# Patient Record
Sex: FEMALE | Race: WHITE | NOT HISPANIC OR LATINO | Employment: UNEMPLOYED | URBAN - METROPOLITAN AREA
[De-identification: names, ages, dates, MRNs, and addresses within clinical notes are randomized per-mention and may not be internally consistent; named-entity substitution may affect disease eponyms.]

---

## 2022-11-23 ENCOUNTER — HOSPITAL ENCOUNTER (INPATIENT)
Facility: HOSPITAL | Age: 52
LOS: 1 days | Discharge: ANOTHER HOSPITAL | DRG: 392 | End: 2022-11-24
Attending: STUDENT IN AN ORGANIZED HEALTH CARE EDUCATION/TRAINING PROGRAM | Admitting: STUDENT IN AN ORGANIZED HEALTH CARE EDUCATION/TRAINING PROGRAM
Payer: COMMERCIAL

## 2022-11-23 DIAGNOSIS — R73.9 HYPERGLYCEMIA: ICD-10-CM

## 2022-11-23 DIAGNOSIS — K52.9 COLITIS: Primary | ICD-10-CM

## 2022-11-23 DIAGNOSIS — E87.29 HIGH ANION GAP METABOLIC ACIDOSIS: ICD-10-CM

## 2022-11-23 DIAGNOSIS — N17.9 AKI (ACUTE KIDNEY INJURY): ICD-10-CM

## 2022-11-23 PROBLEM — I10 PRIMARY HYPERTENSION: Status: ACTIVE | Noted: 2022-11-23

## 2022-11-23 PROBLEM — E11.9 TYPE 2 DIABETES MELLITUS, WITHOUT LONG-TERM CURRENT USE OF INSULIN: Status: ACTIVE | Noted: 2022-11-23

## 2022-11-23 LAB
ACETONE BLD-MCNC: NEGATIVE MG/DL
ALBUMIN SERPL BCP-MCNC: 4.2 G/DL (ref 3.5–5.2)
ALLENS TEST: ABNORMAL
ALP SERPL-CCNC: 75 U/L (ref 55–135)
ALT SERPL W/O P-5'-P-CCNC: 9 U/L (ref 10–44)
AMORPH CRY URNS QL MICRO: ABNORMAL
ANION GAP SERPL CALC-SCNC: 15 MMOL/L (ref 8–16)
AST SERPL-CCNC: 20 U/L (ref 10–40)
BACTERIA #/AREA URNS HPF: ABNORMAL /HPF
BASOPHILS # BLD AUTO: 0.04 K/UL (ref 0–0.2)
BASOPHILS NFR BLD: 0.2 % (ref 0–1.9)
BILIRUB SERPL-MCNC: 0.5 MG/DL (ref 0.1–1)
BILIRUB UR QL STRIP: NEGATIVE
BUN SERPL-MCNC: 62 MG/DL (ref 6–20)
CALCIUM SERPL-MCNC: 9.8 MG/DL (ref 8.7–10.5)
CHLORIDE SERPL-SCNC: 107 MMOL/L (ref 95–110)
CK SERPL-CCNC: 59 U/L (ref 20–180)
CLARITY UR: CLEAR
CO2 SERPL-SCNC: 18 MMOL/L (ref 23–29)
COLOR UR: YELLOW
CREAT SERPL-MCNC: 4.3 MG/DL (ref 0.5–1.4)
CREAT UR-MCNC: 84.2 MG/DL (ref 15–325)
CRP SERPL-MCNC: 14.8 MG/L (ref 0–8.2)
DELSYS: ABNORMAL
DIFFERENTIAL METHOD: ABNORMAL
EOSINOPHIL # BLD AUTO: 0 K/UL (ref 0–0.5)
EOSINOPHIL NFR BLD: 0.1 % (ref 0–8)
ERYTHROCYTE [DISTWIDTH] IN BLOOD BY AUTOMATED COUNT: 13.4 % (ref 11.5–14.5)
ERYTHROCYTE [SEDIMENTATION RATE] IN BLOOD BY WESTERGREN METHOD: 17 MM/HR (ref 0–20)
EST. GFR  (NO RACE VARIABLE): 11.7 ML/MIN/1.73 M^2
GLUCOSE SERPL-MCNC: 135 MG/DL (ref 70–110)
GLUCOSE UR QL STRIP: NEGATIVE
HCO3 UR-SCNC: 20.4 MMOL/L (ref 24–28)
HCT VFR BLD AUTO: 39.4 % (ref 37–48.5)
HGB BLD-MCNC: 12.9 G/DL (ref 12–16)
HGB UR QL STRIP: ABNORMAL
HYALINE CASTS #/AREA URNS LPF: 0 /LPF
IMM GRANULOCYTES # BLD AUTO: 0.07 K/UL (ref 0–0.04)
IMM GRANULOCYTES NFR BLD AUTO: 0.4 % (ref 0–0.5)
KETONES UR QL STRIP: NEGATIVE
LACTATE SERPL-SCNC: 1.8 MMOL/L (ref 0.5–2.2)
LEUKOCYTE ESTERASE UR QL STRIP: NEGATIVE
LIPASE SERPL-CCNC: 90 U/L (ref 4–60)
LYMPHOCYTES # BLD AUTO: 1 K/UL (ref 1–4.8)
LYMPHOCYTES NFR BLD: 5.6 % (ref 18–48)
MCH RBC QN AUTO: 27.3 PG (ref 27–31)
MCHC RBC AUTO-ENTMCNC: 32.7 G/DL (ref 32–36)
MCV RBC AUTO: 84 FL (ref 82–98)
MICROSCOPIC COMMENT: ABNORMAL
MODE: ABNORMAL
MONOCYTES # BLD AUTO: 0.9 K/UL (ref 0.3–1)
MONOCYTES NFR BLD: 5.5 % (ref 4–15)
NEUTROPHILS # BLD AUTO: 15 K/UL (ref 1.8–7.7)
NEUTROPHILS NFR BLD: 88.2 % (ref 38–73)
NITRITE UR QL STRIP: NEGATIVE
NRBC BLD-RTO: 0 /100 WBC
PCO2 BLDA: 36.8 MMHG (ref 35–45)
PH SMN: 7.35 [PH] (ref 7.35–7.45)
PH UR STRIP: 5 [PH] (ref 5–8)
PLATELET # BLD AUTO: 255 K/UL (ref 150–450)
PMV BLD AUTO: 11.2 FL (ref 9.2–12.9)
PO2 BLDA: 13 MMHG (ref 40–60)
POC BE: -5 MMOL/L
POC SATURATED O2: 14 % (ref 95–100)
POC TCO2: 21 MMOL/L (ref 24–29)
POCT GLUCOSE: 113 MG/DL (ref 70–110)
POTASSIUM SERPL-SCNC: 4.8 MMOL/L (ref 3.5–5.1)
PROT SERPL-MCNC: 7.3 G/DL (ref 6–8.4)
PROT UR QL STRIP: ABNORMAL
RBC # BLD AUTO: 4.72 M/UL (ref 4–5.4)
RBC #/AREA URNS HPF: 6 /HPF (ref 0–4)
SAMPLE: ABNORMAL
SITE: ABNORMAL
SODIUM SERPL-SCNC: 140 MMOL/L (ref 136–145)
SODIUM UR-SCNC: 65 MMOL/L (ref 20–250)
SP GR UR STRIP: 1.01 (ref 1–1.03)
SP02: 100
URN SPEC COLLECT METH UR: ABNORMAL
UROBILINOGEN UR STRIP-ACNC: NEGATIVE EU/DL
WBC # BLD AUTO: 17 K/UL (ref 3.9–12.7)
WBC #/AREA URNS HPF: 3 /HPF (ref 0–5)

## 2022-11-23 PROCEDURE — 83690 ASSAY OF LIPASE: CPT | Performed by: STUDENT IN AN ORGANIZED HEALTH CARE EDUCATION/TRAINING PROGRAM

## 2022-11-23 PROCEDURE — 27000207 HC ISOLATION

## 2022-11-23 PROCEDURE — 96365 THER/PROPH/DIAG IV INF INIT: CPT

## 2022-11-23 PROCEDURE — 96375 TX/PRO/DX INJ NEW DRUG ADDON: CPT

## 2022-11-23 PROCEDURE — 93010 ELECTROCARDIOGRAM REPORT: CPT | Mod: ,,, | Performed by: INTERNAL MEDICINE

## 2022-11-23 PROCEDURE — 87040 BLOOD CULTURE FOR BACTERIA: CPT | Performed by: STUDENT IN AN ORGANIZED HEALTH CARE EDUCATION/TRAINING PROGRAM

## 2022-11-23 PROCEDURE — 82009 KETONE BODYS QUAL: CPT | Performed by: STUDENT IN AN ORGANIZED HEALTH CARE EDUCATION/TRAINING PROGRAM

## 2022-11-23 PROCEDURE — 74177 CT ABDOMEN PELVIS WITH CONTRAST: ICD-10-PCS | Mod: 26,,, | Performed by: RADIOLOGY

## 2022-11-23 PROCEDURE — 82570 ASSAY OF URINE CREATININE: CPT | Performed by: STUDENT IN AN ORGANIZED HEALTH CARE EDUCATION/TRAINING PROGRAM

## 2022-11-23 PROCEDURE — 84300 ASSAY OF URINE SODIUM: CPT | Performed by: STUDENT IN AN ORGANIZED HEALTH CARE EDUCATION/TRAINING PROGRAM

## 2022-11-23 PROCEDURE — 93005 ELECTROCARDIOGRAM TRACING: CPT

## 2022-11-23 PROCEDURE — 99900035 HC TECH TIME PER 15 MIN (STAT)

## 2022-11-23 PROCEDURE — 63600175 PHARM REV CODE 636 W HCPCS: Performed by: STUDENT IN AN ORGANIZED HEALTH CARE EDUCATION/TRAINING PROGRAM

## 2022-11-23 PROCEDURE — 25500020 PHARM REV CODE 255: Performed by: STUDENT IN AN ORGANIZED HEALTH CARE EDUCATION/TRAINING PROGRAM

## 2022-11-23 PROCEDURE — 71045 X-RAY EXAM CHEST 1 VIEW: CPT | Mod: 26,,, | Performed by: RADIOLOGY

## 2022-11-23 PROCEDURE — 87389 HIV-1 AG W/HIV-1&-2 AB AG IA: CPT | Performed by: EMERGENCY MEDICINE

## 2022-11-23 PROCEDURE — 71045 XR CHEST AP PORTABLE: ICD-10-PCS | Mod: 26,,, | Performed by: RADIOLOGY

## 2022-11-23 PROCEDURE — 99285 EMERGENCY DEPT VISIT HI MDM: CPT | Mod: 25

## 2022-11-23 PROCEDURE — 85651 RBC SED RATE NONAUTOMATED: CPT | Performed by: STUDENT IN AN ORGANIZED HEALTH CARE EDUCATION/TRAINING PROGRAM

## 2022-11-23 PROCEDURE — 81000 URINALYSIS NONAUTO W/SCOPE: CPT | Performed by: STUDENT IN AN ORGANIZED HEALTH CARE EDUCATION/TRAINING PROGRAM

## 2022-11-23 PROCEDURE — 86803 HEPATITIS C AB TEST: CPT | Performed by: EMERGENCY MEDICINE

## 2022-11-23 PROCEDURE — 25000003 PHARM REV CODE 250: Performed by: STUDENT IN AN ORGANIZED HEALTH CARE EDUCATION/TRAINING PROGRAM

## 2022-11-23 PROCEDURE — 83605 ASSAY OF LACTIC ACID: CPT | Performed by: STUDENT IN AN ORGANIZED HEALTH CARE EDUCATION/TRAINING PROGRAM

## 2022-11-23 PROCEDURE — 82550 ASSAY OF CK (CPK): CPT | Performed by: STUDENT IN AN ORGANIZED HEALTH CARE EDUCATION/TRAINING PROGRAM

## 2022-11-23 PROCEDURE — 36415 COLL VENOUS BLD VENIPUNCTURE: CPT | Performed by: STUDENT IN AN ORGANIZED HEALTH CARE EDUCATION/TRAINING PROGRAM

## 2022-11-23 PROCEDURE — 80053 COMPREHEN METABOLIC PANEL: CPT | Performed by: STUDENT IN AN ORGANIZED HEALTH CARE EDUCATION/TRAINING PROGRAM

## 2022-11-23 PROCEDURE — 86140 C-REACTIVE PROTEIN: CPT | Performed by: STUDENT IN AN ORGANIZED HEALTH CARE EDUCATION/TRAINING PROGRAM

## 2022-11-23 PROCEDURE — 93010 EKG 12-LEAD: ICD-10-PCS | Mod: ,,, | Performed by: INTERNAL MEDICINE

## 2022-11-23 PROCEDURE — 99223 PR INITIAL HOSPITAL CARE,LEVL III: ICD-10-PCS | Mod: ,,, | Performed by: STUDENT IN AN ORGANIZED HEALTH CARE EDUCATION/TRAINING PROGRAM

## 2022-11-23 PROCEDURE — 21400001 HC TELEMETRY ROOM

## 2022-11-23 PROCEDURE — 99223 1ST HOSP IP/OBS HIGH 75: CPT | Mod: ,,, | Performed by: STUDENT IN AN ORGANIZED HEALTH CARE EDUCATION/TRAINING PROGRAM

## 2022-11-23 PROCEDURE — 85025 COMPLETE CBC W/AUTO DIFF WBC: CPT | Performed by: STUDENT IN AN ORGANIZED HEALTH CARE EDUCATION/TRAINING PROGRAM

## 2022-11-23 PROCEDURE — 71045 X-RAY EXAM CHEST 1 VIEW: CPT | Mod: TC

## 2022-11-23 PROCEDURE — 96361 HYDRATE IV INFUSION ADD-ON: CPT

## 2022-11-23 PROCEDURE — 82803 BLOOD GASES ANY COMBINATION: CPT

## 2022-11-23 PROCEDURE — 74177 CT ABD & PELVIS W/CONTRAST: CPT | Mod: 26,,, | Performed by: RADIOLOGY

## 2022-11-23 PROCEDURE — 74177 CT ABD & PELVIS W/CONTRAST: CPT | Mod: TC

## 2022-11-23 PROCEDURE — 82962 GLUCOSE BLOOD TEST: CPT

## 2022-11-23 RX ORDER — ONDANSETRON 2 MG/ML
4 INJECTION INTRAMUSCULAR; INTRAVENOUS
Status: COMPLETED | OUTPATIENT
Start: 2022-11-23 | End: 2022-11-23

## 2022-11-23 RX ORDER — HEPARIN SODIUM 5000 [USP'U]/ML
5000 INJECTION, SOLUTION INTRAVENOUS; SUBCUTANEOUS EVERY 8 HOURS
Status: DISCONTINUED | OUTPATIENT
Start: 2022-11-24 | End: 2022-11-24 | Stop reason: HOSPADM

## 2022-11-23 RX ORDER — SODIUM CHLORIDE, SODIUM LACTATE, POTASSIUM CHLORIDE, CALCIUM CHLORIDE 600; 310; 30; 20 MG/100ML; MG/100ML; MG/100ML; MG/100ML
INJECTION, SOLUTION INTRAVENOUS CONTINUOUS
Status: DISCONTINUED | OUTPATIENT
Start: 2022-11-23 | End: 2022-11-24 | Stop reason: HOSPADM

## 2022-11-23 RX ADMIN — PIPERACILLIN AND TAZOBACTAM 4.5 G: 4; .5 INJECTION, POWDER, LYOPHILIZED, FOR SOLUTION INTRAVENOUS; PARENTERAL at 04:11

## 2022-11-23 RX ADMIN — IOHEXOL 100 ML: 350 INJECTION, SOLUTION INTRAVENOUS at 02:11

## 2022-11-23 RX ADMIN — SODIUM CHLORIDE 1000 ML: 0.9 INJECTION, SOLUTION INTRAVENOUS at 02:11

## 2022-11-23 RX ADMIN — SODIUM CHLORIDE 1000 ML: 0.9 INJECTION, SOLUTION INTRAVENOUS at 03:11

## 2022-11-23 RX ADMIN — ONDANSETRON HYDROCHLORIDE 4 MG: 2 SOLUTION INTRAMUSCULAR; INTRAVENOUS at 01:11

## 2022-11-23 RX ADMIN — SODIUM CHLORIDE, POTASSIUM CHLORIDE, SODIUM LACTATE AND CALCIUM CHLORIDE 1000 ML: 600; 310; 30; 20 INJECTION, SOLUTION INTRAVENOUS at 05:11

## 2022-11-23 RX ADMIN — SODIUM CHLORIDE, SODIUM LACTATE, POTASSIUM CHLORIDE, AND CALCIUM CHLORIDE: .6; .31; .03; .02 INJECTION, SOLUTION INTRAVENOUS at 07:11

## 2022-11-23 NOTE — ED PROVIDER NOTES
Encounter Date: 11/23/2022       History     Chief Complaint   Patient presents with    Nausea    Abdominal Pain    Diarrhea    Vomiting     HPI    Ms. Braga is a 52 year old female with past medical history of type 2 diabetes and hypertension, presenting with several days with diffuse abdominal pain described as getting stabbed in the belly repeatedly, nausea and vomiting, watery diarrhea.  Pain, nausea and diarrhea acutely worsened this morning so she presented to the ED for evaluation.  Has been on a new diabetes medication (Mounjaro, tirzepatide) that is once a week injection, usually gets injection on Friday and has 1 day of symptoms, but this was worse and lasted much longer.  Unable to tolerate any p.o., denies any urinary symptoms but says her urine has been frothy.  Denies any blood in her vomit or diarrhea.  She denies a history of diabetic ketoacidosis.  She used to take metformin but stopped due to side effects.  She denies any sick contacts with similar symptoms.      Review of patient's allergies indicates:   Allergen Reactions    Sulfa (sulfonamide antibiotics) Rash     No past medical history on file.  No past surgical history on file.  No family history on file.     Review of Systems   Constitutional:  Positive for activity change and appetite change. Negative for chills and fever.   HENT:  Negative for congestion and sore throat.    Eyes:  Negative for photophobia and visual disturbance.   Respiratory:  Negative for shortness of breath.    Cardiovascular:  Negative for chest pain and palpitations.   Gastrointestinal:  Positive for abdominal pain, diarrhea, nausea and vomiting. Negative for blood in stool.   Genitourinary:  Negative for dysuria and flank pain.   Musculoskeletal:  Negative for back pain.   Skin:  Negative for rash.   Neurological:  Positive for weakness. Negative for light-headedness.   Hematological:  Does not bruise/bleed easily.   Psychiatric/Behavioral:  Negative for agitation  and behavioral problems.    All other systems reviewed and are negative.    Physical Exam     Initial Vitals [11/23/22 1315]   BP Pulse Resp Temp SpO2   112/68 105 18 97.6 °F (36.4 °C) 100 %      MAP       --         Physical Exam    Nursing note and vitals reviewed.  Constitutional: She appears well-developed and well-nourished. She appears distressed.   HENT:   Head: Normocephalic and atraumatic.   Eyes: EOM are normal. Pupils are equal, round, and reactive to light.   Neck:   Normal range of motion.  Cardiovascular:  Normal heart sounds and intact distal pulses.           Pulmonary/Chest: Breath sounds normal. No respiratory distress.   Abdominal: Abdomen is soft. Bowel sounds are normal. She exhibits no mass. There is abdominal tenderness (diffuse lower abdominal pain). There is no rebound and no guarding.   Musculoskeletal:         General: No tenderness. Normal range of motion.      Cervical back: Normal range of motion.     Neurological: She is alert and oriented to person, place, and time.   Skin: Skin is warm and dry.   Psychiatric: She has a normal mood and affect. Thought content normal.       ED Course   Critical Care    Date/Time: 12/10/2022 7:46 AM  Performed by: Karis Diana MD  Authorized by: Royer Quevedo MD   Direct patient critical care time: 5 minutes  Additional history critical care time: 5 minutes  Ordering / reviewing critical care time: 5 minutes  Documentation critical care time: 5 minutes  Consulting other physicians critical care time: 5 minutes  Consult with family critical care time: 5 minutes  Total critical care time (exclusive of procedural time) : 30 minutes  Critical care time was exclusive of separately billable procedures and treating other patients.  Critical care was necessary to treat or prevent imminent or life-threatening deterioration of the following conditions: renal failure and dehydration.  Critical care was time spent personally by me on the following  activities: development of treatment plan with patient or surrogate, discussions with consultants, evaluation of patient's response to treatment, examination of patient, obtaining history from patient or surrogate, ordering and performing treatments and interventions, ordering and review of laboratory studies, ordering and review of radiographic studies, re-evaluation of patient's condition and review of old charts.      Labs Reviewed   CBC W/ AUTO DIFFERENTIAL - Abnormal; Notable for the following components:       Result Value    WBC 17.00 (*)     Gran # (ANC) 15.0 (*)     Immature Grans (Abs) 0.07 (*)     Gran % 88.2 (*)     Lymph % 5.6 (*)     All other components within normal limits    Narrative:     Release to patient->Immediate   COMPREHENSIVE METABOLIC PANEL - Abnormal; Notable for the following components:    CO2 18 (*)     Glucose 135 (*)     BUN 62 (*)     Creatinine 4.3 (*)     ALT 9 (*)     eGFR 11.7 (*)     All other components within normal limits    Narrative:     Release to patient->Immediate   LIPASE - Abnormal; Notable for the following components:    Lipase 90 (*)     All other components within normal limits    Narrative:     Release to patient->Immediate   POCT GLUCOSE - Abnormal; Notable for the following components:    POCT Glucose 113 (*)     All other components within normal limits   ISTAT PROCEDURE - Abnormal; Notable for the following components:    POC PO2 13 (*)     POC HCO3 20.4 (*)     POC SATURATED O2 14 (*)     POC TCO2 21 (*)     All other components within normal limits   CLOSTRIDIUM DIFFICILE   CULTURE, BLOOD   CULTURE, BLOOD   CULTURE, STOOL   ACETONE    Narrative:     Release to patient->Immediate   LACTIC ACID, PLASMA   HIV 1 / 2 ANTIBODY   HEPATITIS C ANTIBODY   URINALYSIS, REFLEX TO URINE CULTURE   CREATININE, URINE, RANDOM   SODIUM, URINE, RANDOM   CK   H. PYLORI ANTIGEN, STOOL   SEDIMENTATION RATE   C-REACTIVE PROTEIN   WBC, STOOL   POCT GLUCOSE MONITORING CONTINUOUS         ECG Results              EKG 12-lead (Preliminary result)  Result time 11/23/22 15:49:14      ED Interpretation by Karis Diana MD (11/23/22 15:49:14, [facility] Emergency Dept, Emergency Medicine)    EKG normal sinus rhythm at 80, no ST elevation.  T-wave inversions in V1.  No prior for comparison.  Does have delta wave most visible in lead 2.                                   Imaging Results              CT Abdomen Pelvis With Contrast (Final result)  Result time 11/23/22 14:54:35      Final result by Nhi Snyder MD (11/23/22 14:54:35)                   Impression:      Findings suggestive of colitis involving the descending and sigmoid colon.  No abscess or perforation.  No bowel obstruction.    Small hiatal hernia with questionable wall thickening in the distal esophagus.    Fatty infiltration of the liver.  Renal cysts and cortical scarring.      Electronically signed by: Nhi Snyder  Date:    11/23/2022  Time:    14:54               Narrative:    EXAMINATION:  CT ABDOMEN PELVIS WITH CONTRAST    CLINICAL HISTORY:  Nausea/vomiting;    TECHNIQUE:  Low dose axial images, sagittal and coronal reformations were obtained from the lung bases to the pubic symphysis following the IV administration of 100 mL of Omnipaque 350.  No oral contrast was administered    COMPARISON:  None.    FINDINGS:  No significant findings are noted in the visualized portions of the lungs.    There is a small hiatal hernia.  There is questionable mild distal esophageal wall thickening, and there are 2 mildly prominent paraesophageal lymph nodes dist by the level of the diaphragm, series 2, image 20.    There is mild fatty infiltration of the liver.  The gallbladder, spleen, pancreas, adrenal glands are normal.    There are numerous bilateral small renal cortical cysts.  Additionally, there is a a large right midpole parapelvic renal cyst measuring 3.6 cm.  There is right renal cortical scarring.    The urinary  bladder is unremarkable.  Uterus is present.    Aorta is normal in caliber with scattered calcific plaque.    There is no bowel obstruction.  A normal appendix is present.  There is, however, wall thickening and fat stranding involving the distal colon beginning at the level of the splenic flexure and extending into the sigmoid colon.  There is no abscess or perforation.    There are intramedullary rods in the femurs.                                       X-Ray Chest AP Portable (Final result)  Result time 11/23/22 14:35:46      Final result by Rhys Hines MD (11/23/22 14:35:46)                   Impression:      No acute chest disease.      Electronically signed by: Rhys Hines  Date:    11/23/2022  Time:    14:35               Narrative:    EXAMINATION:  XR CHEST AP PORTABLE    CLINICAL HISTORY:  hyperglycemia;    TECHNIQUE:  Portable view of the chest was performed.    COMPARISON:  None.    FINDINGS:  Lungs are clear.  No focal consolidation.  Heart size normal.  Mediastinal contours unremarkable.  Trachea midline.    Bony thorax intact.                                       Medications   sodium chloride 0.9% bolus 1,000 mL (1,000 mLs Intravenous New Bag 11/23/22 1534)   piperacillin-tazobactam 4.5 g in dextrose 5 % 100 mL IVPB (ready to mix system) (has no administration in time range)   ondansetron injection 4 mg (4 mg Intravenous Given 11/23/22 1346)   iohexoL (OMNIPAQUE 350) injection 100 mL (100 mLs Intravenous Given 11/23/22 1438)   sodium chloride 0.9% bolus 1,000 mL (1,000 mLs Intravenous New Bag 11/23/22 1437)     Medical Decision Making:   History:   Old Medical Records: I decided to obtain old medical records.  Clinical Tests:   Lab Tests: Ordered and Reviewed  Radiological Study: Ordered and Reviewed  Medical Tests: Ordered and Reviewed  ED Management:  MDM    Assessment:  52-year-old female with history of diabetes and hypertension, presenting with several days of lower abdominal pain,  nausea vomiting watery diarrhea.  She is mildly tachycardic but vitals otherwise stable, afebrile.  Her exam is notable for diffuse bilateral lower abdominal pain, but her belly is soft.  Ddx:  Most concerning for DKA, non anion gap metabolic acidosis, hyperglycemia, medication side effect, gastroenteritis, diverticulitis.  Lower suspicion but considered acute appendicitis, biliary pathology  Workup:  DKA workup including UA, VBG ordered and reassuring, she is not acidotic.  Lactate and lipase, CT abdomen pelvis contrast.  IV fluid bolus and Zofran for symptoms.  Dispo:   Pending work-up and and re-evaluation.  Anticipate possible admission.      Karis Diana MD  1:50 PM 11/23/2022                    ED Course as of 11/23/22 1551   Wed Nov 23, 2022   1330 WBC(!): 17.00 [CH]   1406 Lipase(!): 90 [CH]   1436 Creatinine(!): 4.3  No baseline in chart.  Urine studies ordered. [CH]   1518 Lactate, Curly: 1.8 [CH]   1519 CT abdomen pelvis concerning for colitis.  IV dose of Zosyn ordered.  Additional IV fluid bolus ordered for MARGARET. [CH]   1549 Hospital medicine consulted, will admit for MARGARET and IV antibiotics for colitis.  Patient updated bedside and agreed to admission.  On reassessment, feeling much better and belly pain improved. [CH]   1551 Per update from daughter, says her mom was told she has stage 3 kidney disease. [CH]      ED Course User Index  [CH] Karis Diana MD                   Clinical Impression:   Final diagnoses:  [R73.9] Hyperglycemia  [K52.9] Colitis (Primary)  [N17.9] MARGARET (acute kidney injury)  [E87.29] High anion gap metabolic acidosis        ED Disposition Condition    Observation Stable                Karis Diana MD  11/23/22 8712       Karis Diana MD  12/10/22 0282

## 2022-11-23 NOTE — ED TRIAGE NOTES
Pt states that for the last 2 months since she began taking the Mongero for diabetes that after she takes the shot she gets diarrhea and vomiting with nausea. Pt states that this usually last 2 days then goes away. Pt states that she took her shot on Friday and this time the diarrhea and vomiting has not gone away. Pt states that she is not able to hold anything down. Pt states that she is having generalized weakness. Pt states that she is having lower abd pain that is sharp and bilateral flank pain

## 2022-11-23 NOTE — HPI
51 yo w/ hx of NIDDM2, HTN, CKD3 w/ baseline Cr 1.5 presenting with NVD that has been going on for 2 months but acutely worsened today and prompted her to come to ED. Patient started on new once a week injectable two months ago. The 1-3 days after each dose she is debilitated with nausea vomiting and diarrhea at least 2-3 times per day. The 2 days after that she will feel constipated and then she receives the shot again. She has told her physician about this but was told this was a normal side effect. Patient denies any sick contacts. No recent abx. No recent hospitaizations. No issues like this in the past.Denies any fever, chills, cough, chest pain, SOB. Today patient woke up and had severe nausea, vomiting, and watery diarrhea multiple times with severe abdominal pain so she decided to come in to the hospital.

## 2022-11-24 VITALS
HEART RATE: 78 BPM | SYSTOLIC BLOOD PRESSURE: 105 MMHG | HEIGHT: 65 IN | OXYGEN SATURATION: 95 % | TEMPERATURE: 97 F | RESPIRATION RATE: 18 BRPM | WEIGHT: 176.38 LBS | BODY MASS INDEX: 29.38 KG/M2 | DIASTOLIC BLOOD PRESSURE: 64 MMHG

## 2022-11-24 LAB
ABO + RH BLD: NORMAL
ALBUMIN SERPL BCP-MCNC: 3.1 G/DL (ref 3.5–5.2)
ALP SERPL-CCNC: 55 U/L (ref 55–135)
ALT SERPL W/O P-5'-P-CCNC: 6 U/L (ref 10–44)
ANION GAP SERPL CALC-SCNC: 11 MMOL/L (ref 8–16)
AST SERPL-CCNC: 14 U/L (ref 10–40)
BASOPHILS # BLD AUTO: 0.03 K/UL (ref 0–0.2)
BASOPHILS # BLD AUTO: 0.03 K/UL (ref 0–0.2)
BASOPHILS NFR BLD: 0.3 % (ref 0–1.9)
BASOPHILS NFR BLD: 0.3 % (ref 0–1.9)
BILIRUB SERPL-MCNC: 0.6 MG/DL (ref 0.1–1)
BLD GP AB SCN CELLS X3 SERPL QL: NORMAL
BUN SERPL-MCNC: 43 MG/DL (ref 6–20)
C DIFF GDH STL QL: NEGATIVE
C DIFF TOX A+B STL QL IA: NEGATIVE
CALCIUM SERPL-MCNC: 8.8 MG/DL (ref 8.7–10.5)
CHLORIDE SERPL-SCNC: 111 MMOL/L (ref 95–110)
CO2 SERPL-SCNC: 20 MMOL/L (ref 23–29)
CREAT SERPL-MCNC: 3.2 MG/DL (ref 0.5–1.4)
DIFFERENTIAL METHOD: ABNORMAL
DIFFERENTIAL METHOD: ABNORMAL
EOSINOPHIL # BLD AUTO: 0.1 K/UL (ref 0–0.5)
EOSINOPHIL # BLD AUTO: 0.1 K/UL (ref 0–0.5)
EOSINOPHIL NFR BLD: 0.6 % (ref 0–8)
EOSINOPHIL NFR BLD: 0.8 % (ref 0–8)
ERYTHROCYTE [DISTWIDTH] IN BLOOD BY AUTOMATED COUNT: 13.7 % (ref 11.5–14.5)
ERYTHROCYTE [DISTWIDTH] IN BLOOD BY AUTOMATED COUNT: 13.8 % (ref 11.5–14.5)
EST. GFR  (NO RACE VARIABLE): 16.7 ML/MIN/1.73 M^2
ESTIMATED AVG GLUCOSE: 143 MG/DL (ref 68–131)
GLUCOSE SERPL-MCNC: 100 MG/DL (ref 70–110)
HBA1C MFR BLD: 6.6 % (ref 4–5.6)
HCT VFR BLD AUTO: 32.2 % (ref 37–48.5)
HCT VFR BLD AUTO: 33.9 % (ref 37–48.5)
HCV AB SERPL QL IA: NORMAL
HGB BLD-MCNC: 10.5 G/DL (ref 12–16)
HGB BLD-MCNC: 11.1 G/DL (ref 12–16)
HIV 1+2 AB+HIV1 P24 AG SERPL QL IA: NORMAL
IMM GRANULOCYTES # BLD AUTO: 0.04 K/UL (ref 0–0.04)
IMM GRANULOCYTES # BLD AUTO: 0.04 K/UL (ref 0–0.04)
IMM GRANULOCYTES NFR BLD AUTO: 0.4 % (ref 0–0.5)
IMM GRANULOCYTES NFR BLD AUTO: 0.4 % (ref 0–0.5)
LACTATE SERPL-SCNC: 1 MMOL/L (ref 0.5–2.2)
LYMPHOCYTES # BLD AUTO: 1.6 K/UL (ref 1–4.8)
LYMPHOCYTES # BLD AUTO: 1.7 K/UL (ref 1–4.8)
LYMPHOCYTES NFR BLD: 15.5 % (ref 18–48)
LYMPHOCYTES NFR BLD: 16.4 % (ref 18–48)
MAGNESIUM SERPL-MCNC: 1.9 MG/DL (ref 1.6–2.6)
MCH RBC QN AUTO: 27.5 PG (ref 27–31)
MCH RBC QN AUTO: 27.5 PG (ref 27–31)
MCHC RBC AUTO-ENTMCNC: 32.6 G/DL (ref 32–36)
MCHC RBC AUTO-ENTMCNC: 32.7 G/DL (ref 32–36)
MCV RBC AUTO: 84 FL (ref 82–98)
MCV RBC AUTO: 84 FL (ref 82–98)
MONOCYTES # BLD AUTO: 0.5 K/UL (ref 0.3–1)
MONOCYTES # BLD AUTO: 0.6 K/UL (ref 0.3–1)
MONOCYTES NFR BLD: 5.1 % (ref 4–15)
MONOCYTES NFR BLD: 5.4 % (ref 4–15)
NEUTROPHILS # BLD AUTO: 7.4 K/UL (ref 1.8–7.7)
NEUTROPHILS # BLD AUTO: 8.6 K/UL (ref 1.8–7.7)
NEUTROPHILS NFR BLD: 76.7 % (ref 38–73)
NEUTROPHILS NFR BLD: 78.1 % (ref 38–73)
NRBC BLD-RTO: 0 /100 WBC
NRBC BLD-RTO: 0 /100 WBC
PHOSPHATE SERPL-MCNC: 3.3 MG/DL (ref 2.7–4.5)
PLATELET # BLD AUTO: 189 K/UL (ref 150–450)
PLATELET # BLD AUTO: 199 K/UL (ref 150–450)
PMV BLD AUTO: 10.8 FL (ref 9.2–12.9)
PMV BLD AUTO: 10.9 FL (ref 9.2–12.9)
POCT GLUCOSE: 86 MG/DL (ref 70–110)
POTASSIUM SERPL-SCNC: 4.7 MMOL/L (ref 3.5–5.1)
PROT SERPL-MCNC: 6 G/DL (ref 6–8.4)
RBC # BLD AUTO: 3.82 M/UL (ref 4–5.4)
RBC # BLD AUTO: 4.04 M/UL (ref 4–5.4)
SODIUM SERPL-SCNC: 142 MMOL/L (ref 136–145)
WBC # BLD AUTO: 11.03 K/UL (ref 3.9–12.7)
WBC # BLD AUTO: 9.59 K/UL (ref 3.9–12.7)
WBC #/AREA STL HPF: ABNORMAL /[HPF]

## 2022-11-24 PROCEDURE — 89055 LEUKOCYTE ASSESSMENT FECAL: CPT | Performed by: STUDENT IN AN ORGANIZED HEALTH CARE EDUCATION/TRAINING PROGRAM

## 2022-11-24 PROCEDURE — 83735 ASSAY OF MAGNESIUM: CPT | Performed by: STUDENT IN AN ORGANIZED HEALTH CARE EDUCATION/TRAINING PROGRAM

## 2022-11-24 PROCEDURE — 36415 COLL VENOUS BLD VENIPUNCTURE: CPT | Performed by: STUDENT IN AN ORGANIZED HEALTH CARE EDUCATION/TRAINING PROGRAM

## 2022-11-24 PROCEDURE — 87338 HPYLORI STOOL AG IA: CPT | Performed by: STUDENT IN AN ORGANIZED HEALTH CARE EDUCATION/TRAINING PROGRAM

## 2022-11-24 PROCEDURE — 99239 HOSP IP/OBS DSCHRG MGMT >30: CPT | Mod: ,,, | Performed by: STUDENT IN AN ORGANIZED HEALTH CARE EDUCATION/TRAINING PROGRAM

## 2022-11-24 PROCEDURE — 63600175 PHARM REV CODE 636 W HCPCS: Performed by: STUDENT IN AN ORGANIZED HEALTH CARE EDUCATION/TRAINING PROGRAM

## 2022-11-24 PROCEDURE — 84100 ASSAY OF PHOSPHORUS: CPT | Performed by: STUDENT IN AN ORGANIZED HEALTH CARE EDUCATION/TRAINING PROGRAM

## 2022-11-24 PROCEDURE — 86850 RBC ANTIBODY SCREEN: CPT | Performed by: STUDENT IN AN ORGANIZED HEALTH CARE EDUCATION/TRAINING PROGRAM

## 2022-11-24 PROCEDURE — 83605 ASSAY OF LACTIC ACID: CPT | Performed by: STUDENT IN AN ORGANIZED HEALTH CARE EDUCATION/TRAINING PROGRAM

## 2022-11-24 PROCEDURE — 87324 CLOSTRIDIUM AG IA: CPT | Performed by: STUDENT IN AN ORGANIZED HEALTH CARE EDUCATION/TRAINING PROGRAM

## 2022-11-24 PROCEDURE — 99239 PR HOSPITAL DISCHARGE DAY,>30 MIN: ICD-10-PCS | Mod: ,,, | Performed by: STUDENT IN AN ORGANIZED HEALTH CARE EDUCATION/TRAINING PROGRAM

## 2022-11-24 PROCEDURE — 25000003 PHARM REV CODE 250: Performed by: STUDENT IN AN ORGANIZED HEALTH CARE EDUCATION/TRAINING PROGRAM

## 2022-11-24 PROCEDURE — 83036 HEMOGLOBIN GLYCOSYLATED A1C: CPT | Performed by: STUDENT IN AN ORGANIZED HEALTH CARE EDUCATION/TRAINING PROGRAM

## 2022-11-24 PROCEDURE — 80053 COMPREHEN METABOLIC PANEL: CPT | Performed by: STUDENT IN AN ORGANIZED HEALTH CARE EDUCATION/TRAINING PROGRAM

## 2022-11-24 PROCEDURE — 85025 COMPLETE CBC W/AUTO DIFF WBC: CPT | Performed by: STUDENT IN AN ORGANIZED HEALTH CARE EDUCATION/TRAINING PROGRAM

## 2022-11-24 RX ADMIN — PIPERACILLIN AND TAZOBACTAM 3.38 G: 3; .375 INJECTION, POWDER, LYOPHILIZED, FOR SOLUTION INTRAVENOUS; PARENTERAL at 04:11

## 2022-11-24 RX ADMIN — HEPARIN SODIUM 5000 UNITS: 5000 INJECTION, SOLUTION INTRAVENOUS; SUBCUTANEOUS at 06:11

## 2022-11-24 RX ADMIN — SODIUM CHLORIDE, SODIUM LACTATE, POTASSIUM CHLORIDE, AND CALCIUM CHLORIDE: .6; .31; .03; .02 INJECTION, SOLUTION INTRAVENOUS at 03:11

## 2022-11-24 NOTE — PLAN OF CARE
Problem: Adult Inpatient Plan of Care  Goal: Plan of Care Review  Outcome: Ongoing, Progressing  Goal: Patient-Specific Goal (Individualized)  Outcome: Ongoing, Progressing  Goal: Absence of Hospital-Acquired Illness or Injury  Outcome: Ongoing, Progressing  Goal: Optimal Comfort and Wellbeing  Outcome: Ongoing, Progressing  Goal: Readiness for Transition of Care  Outcome: Ongoing, Progressing     Problem: Fluid and Electrolyte Imbalance (Acute Kidney Injury/Impairment)  Goal: Fluid and Electrolyte Balance  Outcome: Ongoing, Progressing     Problem: Oral Intake Inadequate (Acute Kidney Injury/Impairment)  Goal: Optimal Nutrition Intake  Outcome: Ongoing, Progressing     Problem: Renal Function Impairment (Acute Kidney Injury/Impairment)  Goal: Effective Renal Function  Outcome: Ongoing, Progressing     Problem: Diabetes Comorbidity  Goal: Blood Glucose Level Within Targeted Range  Outcome: Ongoing, Progressing     Problem: Infection  Goal: Absence of Infection Signs and Symptoms  Outcome: Ongoing, Progressing     Problem: Pain Acute  Goal: Acceptable Pain Control and Functional Ability  Outcome: Ongoing, Progressing   POC reviewed at bedside. Questions and concerns addressed. VSS. Placed bed in low and locked position. Call light within reach. Side rails up x2. Instructed to call for any needs. Verbalized understanding of all instructions. Frequent rounds.

## 2022-11-24 NOTE — H&P
[facility] Medicine  History & Physical    Patient Name: Catarina Braga  MRN: [id number]  Patient Class: OP- Observation  Admission Date: 11/23/2022  Attending Physician: Royer Quevedo MD   Primary Care Provider: Cameron Lloyd MD         Patient information was obtained from patient and ER records.     Subjective:     Principal Problem:Colitis    Chief Complaint:   Chief Complaint   Patient presents with    Nausea    Abdominal Pain    Diarrhea    Vomiting        HPI: 51 yo w/ hx of NIDDM2, HTN, CKD3 w/ baseline Cr 1.5 presenting with NVD that has been going on for 2 months but acutely worsened today and prompted her to come to ED. Patient started on new once a week injectable two months ago. The 1-3 days after each dose she is debilitated with nausea vomiting and diarrhea at least 2-3 times per day. The 2 days after that she will feel constipated and then she receives the shot again. She has told her physician about this but was told this was a normal side effect. Patient denies any sick contacts. No recent abx. No recent hospitaizations. No issues like this in the past.Denies any fever, chills, cough, chest pain, SOB. Today patient woke up and had severe nausea, vomiting, and watery diarrhea multiple times with severe abdominal pain so she decided to come in to the hospital.      No past medical history on file.    Surgery for trauma to lower extremity in the past    Review of patient's allergies indicates:   Allergen Reactions    Sulfa (sulfonamide antibiotics) Rash       No current facility-administered medications on file prior to encounter.     No current outpatient medications on file prior to encounter.     Family History    CKD-dad       Tobacco Use    Smoking status: Denies    Smokeless tobacco: Denies   Substance and Sexual Activity    Alcohol use: Denies    Drug use: Denies          Review of Systems   All other systems reviewed and are  negative.  Objective:     Vital Signs (Most Recent):  Temp: 97.6 °F (36.4 °C) (11/23/22 1315)  Pulse: 85 (11/23/22 1817)  Resp: 16 (11/23/22 1748)  BP: 109/67 (11/23/22 1817)  SpO2: 97 % (11/23/22 1817) Vital Signs (24h Range):  Temp:  [97.6 °F (36.4 °C)] 97.6 °F (36.4 °C)  Pulse:  [] 85  Resp:  [16-19] 16  SpO2:  [97 %-100 %] 97 %  BP: ()/(67-72) 109/67     Weight: 76.2 kg (168 lb)  Body mass index is 27.96 kg/m².    Physical Exam  Vitals reviewed.   Constitutional:       Appearance: Normal appearance.   HENT:      Head: Normocephalic and atraumatic.   Eyes:      Extraocular Movements: Extraocular movements intact.      Pupils: Pupils are equal, round, and reactive to light.   Cardiovascular:      Rate and Rhythm: Normal rate and regular rhythm.   Pulmonary:      Effort: Pulmonary effort is normal. No respiratory distress.   Abdominal:      General: There is no distension.      Palpations: Abdomen is soft.      Tenderness: There is abdominal tenderness.   Musculoskeletal:      Right lower leg: No edema.      Left lower leg: No edema.   Skin:     General: Skin is warm and dry.   Neurological:      General: No focal deficit present.      Mental Status: She is alert and oriented to person, place, and time.   Psychiatric:         Mood and Affect: Mood normal.         Behavior: Behavior normal.         CRANIAL NERVES     CN III, IV, VI   Pupils are equal, round, and reactive to light.     Significant Labs: All pertinent labs within the past 24 hours have been reviewed.    Significant Imaging: I have reviewed all pertinent imaging results/findings within the past 24 hours.    Assessment/Plan:     * Colitis  NPO  IVF  Zosyn  F/U Blood and stool culture  Cdiff ordered  H pylori ag  CRP and ESR ordered  Surgery consult if no improvement with conservative measures      Primary hypertension  Start home medications as needed      Type 2 diabetes mellitus, without long-term current use of insulin  Patient's FSGs are  controlled on current medication regimen.  Last A1c reviewed- No results found for: LABA1C, HGBA1C  Most recent fingerstick glucose reviewed- Recent Labs   Lab 11/23/22  1331   POCTGLUCOSE 113*     Current correctional scale  None currently  Maintain anti-hyperglycemic dose as follows-   Antihyperglycemics (From admission, onward)    None        Hold Oral hypoglycemics while patient is in the hospital.    MARGARET (acute kidney injury)  MARGARET on CKD3- Baseline Cr 1.5  Likely pre-renal- F/U urine studies  IVF- continue on maintenance overnight   Renally dose medications        VTE Risk Mitigation (From admission, onward)         Ordered     heparin (porcine) injection 5,000 Units  Every 8 hours         11/23/22 1906                   Royer Quevedo MD  Department of Hospital Medicine   [facility]

## 2022-11-24 NOTE — NURSING TRANSFER
Nursing Transfer Note      11/24/2022     Reason patient is being transferred: Colitis; need for surgical consultation.    Transfer To: [facility], [place], [place],     Transfer via stretcher    Transported by [facility].    Chart send with patient: Yes - imaging disc.    Report called to ERIK Martinez.

## 2022-11-24 NOTE — ASSESSMENT & PLAN NOTE
Patient's FSGs are controlled on current medication regimen.  Last A1c reviewed- No results found for: LABA1C, HGBA1C  Most recent fingerstick glucose reviewed- Recent Labs   Lab 11/23/22  1331   POCTGLUCOSE 113*     Current correctional scale  None currently  Maintain anti-hyperglycemic dose as follows-   Antihyperglycemics (From admission, onward)    None        Hold Oral hypoglycemics while patient is in the hospital.

## 2022-11-24 NOTE — SUBJECTIVE & OBJECTIVE
No past medical history on file.    Surgery for trauma to lower extremity in the past    Review of patient's allergies indicates:   Allergen Reactions    Sulfa (sulfonamide antibiotics) Rash       No current facility-administered medications on file prior to encounter.     No current outpatient medications on file prior to encounter.     Family History    CKD-dad       Tobacco Use    Smoking status: Denies    Smokeless tobacco: Denies   Substance and Sexual Activity    Alcohol use: Denies    Drug use: Denies          Review of Systems   All other systems reviewed and are negative.  Objective:     Vital Signs (Most Recent):  Temp: 97.6 °F (36.4 °C) (11/23/22 1315)  Pulse: 85 (11/23/22 1817)  Resp: 16 (11/23/22 1748)  BP: 109/67 (11/23/22 1817)  SpO2: 97 % (11/23/22 1817) Vital Signs (24h Range):  Temp:  [97.6 °F (36.4 °C)] 97.6 °F (36.4 °C)  Pulse:  [] 85  Resp:  [16-19] 16  SpO2:  [97 %-100 %] 97 %  BP: ()/(67-72) 109/67     Weight: 76.2 kg (168 lb)  Body mass index is 27.96 kg/m².    Physical Exam  Vitals reviewed.   Constitutional:       Appearance: Normal appearance.   HENT:      Head: Normocephalic and atraumatic.   Eyes:      Extraocular Movements: Extraocular movements intact.      Pupils: Pupils are equal, round, and reactive to light.   Cardiovascular:      Rate and Rhythm: Normal rate and regular rhythm.   Pulmonary:      Effort: Pulmonary effort is normal. No respiratory distress.   Abdominal:      General: There is no distension.      Palpations: Abdomen is soft.      Tenderness: There is abdominal tenderness.   Musculoskeletal:      Right lower leg: No edema.      Left lower leg: No edema.   Skin:     General: Skin is warm and dry.   Neurological:      General: No focal deficit present.      Mental Status: She is alert and oriented to person, place, and time.   Psychiatric:         Mood and Affect: Mood normal.         Behavior: Behavior normal.         CRANIAL NERVES     CN III, IV, VI    Pupils are equal, round, and reactive to light.     Significant Labs: All pertinent labs within the past 24 hours have been reviewed.    Significant Imaging: I have reviewed all pertinent imaging results/findings within the past 24 hours.

## 2022-11-24 NOTE — ASSESSMENT & PLAN NOTE
MARGARET on CKD3- Baseline Cr 1.5  Likely pre-renal- F/U urine studies  IVF- continue on maintenance overnight   Renally dose medications

## 2022-11-25 NOTE — PLAN OF CARE
11/25/22 0748   Final Note   Assessment Type Final Discharge Note   Anticipated Discharge Disposition Short Term     Patient transferred to [facility]  11/24/22 for upgrade in care. No other needs noted.

## 2022-11-29 LAB
BACTERIA BLD CULT: NORMAL
BACTERIA BLD CULT: NORMAL

## 2022-12-01 LAB
H PYLORI AG STL QL IA: NOT DETECTED
SPECIMEN SOURCE: NORMAL

## 2022-12-06 NOTE — DISCHARGE SUMMARY
[facility]  Discharge Summary      Patient Name: Catarina Braga  MRN: [id number]  RICHARD: [id number]  Patient Class: IP- Inpatient  Admission Date: 11/23/2022  Hospital Length of Stay: 1 days  Discharge Date and Time: 11/24/2022 11:50 AM  Attending Physician: No att. providers found   Discharging Provider: Royer Quevedo MD  Primary Care Provider: Cameron Lloyd MD    Primary Care Team: Networked reference to record PCT     HPI:   51 yo w/ hx of NIDDM2, HTN, CKD3 w/ baseline Cr 1.5 presenting with NVD that has been going on for 2 months but acutely worsened today and prompted her to come to ED. Patient started on new once a week injectable two months ago. The 1-3 days after each dose she is debilitated with nausea vomiting and diarrhea at least 2-3 times per day. The 2 days after that she will feel constipated and then she receives the shot again. She has told her physician about this but was told this was a normal side effect. Patient denies any sick contacts. No recent abx. No recent hospitaizations. No issues like this in the past.Denies any fever, chills, cough, chest pain, SOB. Today patient woke up and had severe nausea, vomiting, and watery diarrhea multiple times with severe abdominal pain so she decided to come in to the hospital.      * No surgery found *      Hospital Course:   Patient admitted for colitis and MARGARET on CKD. Made NPO. Given IVF. Started IV abx. Attempted conservative measures. Patient unfortunately started having bloody diarrhea morning after admit and had to be transferred to OSH for GI and surgical evaluation. Patient was medically stable at time of transfer.       Goals of Care Treatment Preferences:         Consults:     No new Assessment & Plan notes have been filed under this hospital service since the last note was generated.  Service: Hospital Medicine    Final Active Diagnoses:    Diagnosis Date Noted POA    PRINCIPAL PROBLEM:  Colitis  [K52.9] 11/23/2022 Yes    MARGARET (acute kidney injury) [N17.9] 11/23/2022 Yes    Type 2 diabetes mellitus, without long-term current use of insulin [E11.9] 11/23/2022 Yes    Primary hypertension [I10] 11/23/2022 Yes      Problems Resolved During this Admission:       Discharged Condition: stable    Disposition: Short Term Hospital    Follow Up:    Patient Instructions:   No discharge procedures on file.    Significant Diagnostic Studies:   Recent Results (from the past 336 hour(s))   POCT glucose    Collection Time: 11/23/22  1:31 PM   Result Value Ref Range    POCT Glucose 113 (H) 70 - 110 mg/dL   ISTAT PROCEDURE    Collection Time: 11/23/22  1:40 PM   Result Value Ref Range    POC PH 7.352 7.35 - 7.45    POC PCO2 36.8 35 - 45 mmHg    POC PO2 13 (L) 40 - 60 mmHg    POC HCO3 20.4 (L) 24 - 28 mmol/L    POC BE -5 -2 to 2 mmol/L    POC SATURATED O2 14 (L) 95 - 100 %    POC TCO2 21 (L) 24 - 29 mmol/L    Sample VENOUS     Site Other     Allens Test N/A     DelSys Room Air     Mode SPONT     Sp02 100    HIV 1/2 Ag/Ab (4th Gen)    Collection Time: 11/23/22  1:43 PM   Result Value Ref Range    HIV 1/2 Ag/Ab Non-reactive Non-reactive   Hepatitis C Antibody    Collection Time: 11/23/22  1:43 PM   Result Value Ref Range    Hepatitis C Ab Non-reactive Non-reactive   CBC auto differential    Collection Time: 11/23/22  1:43 PM   Result Value Ref Range    WBC 17.00 (H) 3.90 - 12.70 K/uL    RBC 4.72 4.00 - 5.40 M/uL    Hemoglobin 12.9 12.0 - 16.0 g/dL    Hematocrit 39.4 37.0 - 48.5 %    MCV 84 82 - 98 fL    MCH 27.3 27.0 - 31.0 pg    MCHC 32.7 32.0 - 36.0 g/dL    RDW 13.4 11.5 - 14.5 %    Platelets 255 150 - 450 K/uL    MPV 11.2 9.2 - 12.9 fL    Immature Granulocytes 0.4 0.0 - 0.5 %    Gran # (ANC) 15.0 (H) 1.8 - 7.7 K/uL    Immature Grans (Abs) 0.07 (H) 0.00 - 0.04 K/uL    Lymph # 1.0 1.0 - 4.8 K/uL    Mono # 0.9 0.3 - 1.0 K/uL    Eos # 0.0 0.0 - 0.5 K/uL    Baso # 0.04 0.00 - 0.20 K/uL    nRBC 0 0 /100 WBC    Gran % 88.2 (H) 38.0  - 73.0 %    Lymph % 5.6 (L) 18.0 - 48.0 %    Mono % 5.5 4.0 - 15.0 %    Eosinophil % 0.1 0.0 - 8.0 %    Basophil % 0.2 0.0 - 1.9 %    Differential Method Automated    Comprehensive metabolic panel    Collection Time: 11/23/22  1:43 PM   Result Value Ref Range    Sodium 140 136 - 145 mmol/L    Potassium 4.8 3.5 - 5.1 mmol/L    Chloride 107 95 - 110 mmol/L    CO2 18 (L) 23 - 29 mmol/L    Glucose 135 (H) 70 - 110 mg/dL    BUN 62 (H) 6 - 20 mg/dL    Creatinine 4.3 (H) 0.5 - 1.4 mg/dL    Calcium 9.8 8.7 - 10.5 mg/dL    Total Protein 7.3 6.0 - 8.4 g/dL    Albumin 4.2 3.5 - 5.2 g/dL    Total Bilirubin 0.5 0.1 - 1.0 mg/dL    Alkaline Phosphatase 75 55 - 135 U/L    AST 20 10 - 40 U/L    ALT 9 (L) 10 - 44 U/L    Anion Gap 15 8 - 16 mmol/L    eGFR 11.7 (A) >60 mL/min/1.73 m^2   Acetone    Collection Time: 11/23/22  1:43 PM   Result Value Ref Range    Acetone, Bld Negative Negative   Lipase    Collection Time: 11/23/22  1:43 PM   Result Value Ref Range    Lipase 90 (H) 4 - 60 U/L   Lactic acid, plasma    Collection Time: 11/23/22  1:47 PM   Result Value Ref Range    Lactate (Lactic Acid) 1.8 0.5 - 2.2 mmol/L   CPK    Collection Time: 11/23/22  3:58 PM   Result Value Ref Range    CPK 59 20 - 180 U/L   Blood culture    Collection Time: 11/23/22  3:58 PM    Specimen: Blood   Result Value Ref Range    Blood Culture, Routine No growth after 5 days.    Urinalysis, Reflex to Urine Culture Urine, Clean Catch    Collection Time: 11/23/22  4:07 PM    Specimen: Urine   Result Value Ref Range    Specimen UA Urine, Unspecified     Color, UA Yellow Yellow, Straw, Patito    Appearance, UA Clear Clear    pH, UA 5.0 5.0 - 8.0    Specific Gravity, UA 1.010 1.005 - 1.030    Protein, UA 2+ (A) Negative    Glucose, UA Negative Negative    Ketones, UA Negative Negative    Bilirubin (UA) Negative Negative    Occult Blood UA 1+ (A) Negative    Nitrite, UA Negative Negative    Urobilinogen, UA Negative Negative EU/dL    Leukocytes, UA Negative Negative    Urinalysis Microscopic    Collection Time: 11/23/22  4:07 PM   Result Value Ref Range    RBC, UA 6 (H) 0 - 4 /hpf    WBC, UA 3 0 - 5 /hpf    Bacteria Rare None-Occ /hpf    Hyaline Casts, UA 0 0-1/lpf /lpf    Amorphous, UA Few None-Moderate    Microscopic Comment SEE COMMENT    Creatinine, urine, random    Collection Time: 11/23/22  4:08 PM   Result Value Ref Range    Creatinine, Urine 84.2 15.0 - 325.0 mg/dL   Sodium, urine, random    Collection Time: 11/23/22  4:08 PM   Result Value Ref Range    Sodium, Urine 65 20 - 250 mmol/L   Blood culture    Collection Time: 11/23/22  4:45 PM    Specimen: Blood   Result Value Ref Range    Blood Culture, Routine No growth after 5 days.    Sedimentation rate    Collection Time: 11/23/22  4:45 PM   Result Value Ref Range    Sed Rate 17 0 - 20 mm/Hr   C-reactive protein    Collection Time: 11/23/22  4:45 PM   Result Value Ref Range    CRP 14.8 (H) 0.0 - 8.2 mg/L   POCT glucose    Collection Time: 11/24/22 12:56 AM   Result Value Ref Range    POCT Glucose 86 70 - 110 mg/dL   CBC Auto Differential    Collection Time: 11/24/22  7:32 AM   Result Value Ref Range    WBC 9.59 3.90 - 12.70 K/uL    RBC 3.82 (L) 4.00 - 5.40 M/uL    Hemoglobin 10.5 (L) 12.0 - 16.0 g/dL    Hematocrit 32.2 (L) 37.0 - 48.5 %    MCV 84 82 - 98 fL    MCH 27.5 27.0 - 31.0 pg    MCHC 32.6 32.0 - 36.0 g/dL    RDW 13.8 11.5 - 14.5 %    Platelets 189 150 - 450 K/uL    MPV 10.9 9.2 - 12.9 fL    Immature Granulocytes 0.4 0.0 - 0.5 %    Gran # (ANC) 7.4 1.8 - 7.7 K/uL    Immature Grans (Abs) 0.04 0.00 - 0.04 K/uL    Lymph # 1.6 1.0 - 4.8 K/uL    Mono # 0.5 0.3 - 1.0 K/uL    Eos # 0.1 0.0 - 0.5 K/uL    Baso # 0.03 0.00 - 0.20 K/uL    nRBC 0 0 /100 WBC    Gran % 76.7 (H) 38.0 - 73.0 %    Lymph % 16.4 (L) 18.0 - 48.0 %    Mono % 5.4 4.0 - 15.0 %    Eosinophil % 0.8 0.0 - 8.0 %    Basophil % 0.3 0.0 - 1.9 %    Differential Method Automated    Comprehensive Metabolic Panel    Collection Time: 11/24/22  7:32 AM   Result  Value Ref Range    Sodium 142 136 - 145 mmol/L    Potassium 4.7 3.5 - 5.1 mmol/L    Chloride 111 (H) 95 - 110 mmol/L    CO2 20 (L) 23 - 29 mmol/L    Glucose 100 70 - 110 mg/dL    BUN 43 (H) 6 - 20 mg/dL    Creatinine 3.2 (H) 0.5 - 1.4 mg/dL    Calcium 8.8 8.7 - 10.5 mg/dL    Total Protein 6.0 6.0 - 8.4 g/dL    Albumin 3.1 (L) 3.5 - 5.2 g/dL    Total Bilirubin 0.6 0.1 - 1.0 mg/dL    Alkaline Phosphatase 55 55 - 135 U/L    AST 14 10 - 40 U/L    ALT 6 (L) 10 - 44 U/L    Anion Gap 11 8 - 16 mmol/L    eGFR 16.7 (A) >60 mL/min/1.73 m^2   Magnesium    Collection Time: 11/24/22  7:32 AM   Result Value Ref Range    Magnesium 1.9 1.6 - 2.6 mg/dL   Phosphorus    Collection Time: 11/24/22  7:32 AM   Result Value Ref Range    Phosphorus 3.3 2.7 - 4.5 mg/dL   Hemoglobin A1c    Collection Time: 11/24/22  7:32 AM   Result Value Ref Range    Hemoglobin A1C 6.6 (H) 4.0 - 5.6 %    Estimated Avg Glucose 143 (H) 68 - 131 mg/dL   Clostridium difficile EIA    Collection Time: 11/24/22  9:06 AM    Specimen: Stool   Result Value Ref Range    C. diff Antigen Negative Negative    C difficile Toxins A+B, EIA Negative Negative   H. pylori antigen, stool    Collection Time: 11/24/22  9:07 AM   Result Value Ref Range    H. pylori Antigen Source stool     H. Pylori Antigen, Stool NOT DETECTED NOT DETECTED   WBC, Stool    Collection Time: 11/24/22  9:07 AM   Result Value Ref Range    Stool WBC Occ neutrophils seen (A) No neutrophils seen   Type & Screen    Collection Time: 11/24/22  9:17 AM   Result Value Ref Range    Group & Rh O POS     Indirect Mark NEG    Lactic acid, plasma    Collection Time: 11/24/22  9:17 AM   Result Value Ref Range    Lactate (Lactic Acid) 1.0 0.5 - 2.2 mmol/L   CBC Auto Differential    Collection Time: 11/24/22  9:17 AM   Result Value Ref Range    WBC 11.03 3.90 - 12.70 K/uL    RBC 4.04 4.00 - 5.40 M/uL    Hemoglobin 11.1 (L) 12.0 - 16.0 g/dL    Hematocrit 33.9 (L) 37.0 - 48.5 %    MCV 84 82 - 98 fL    MCH 27.5 27.0 -  31.0 pg    MCHC 32.7 32.0 - 36.0 g/dL    RDW 13.7 11.5 - 14.5 %    Platelets 199 150 - 450 K/uL    MPV 10.8 9.2 - 12.9 fL    Immature Granulocytes 0.4 0.0 - 0.5 %    Gran # (ANC) 8.6 (H) 1.8 - 7.7 K/uL    Immature Grans (Abs) 0.04 0.00 - 0.04 K/uL    Lymph # 1.7 1.0 - 4.8 K/uL    Mono # 0.6 0.3 - 1.0 K/uL    Eos # 0.1 0.0 - 0.5 K/uL    Baso # 0.03 0.00 - 0.20 K/uL    nRBC 0 0 /100 WBC    Gran % 78.1 (H) 38.0 - 73.0 %    Lymph % 15.5 (L) 18.0 - 48.0 %    Mono % 5.1 4.0 - 15.0 %    Eosinophil % 0.6 0.0 - 8.0 %    Basophil % 0.3 0.0 - 1.9 %    Differential Method Automated    PROTIME-INR    Collection Time: 11/24/22  2:03 PM   Result Value Ref Range    PT 12.1 10.5 - 12.8 sec    INR 1.10 <5.00 INR   APTT    Collection Time: 11/24/22  2:03 PM   Result Value Ref Range    PTT 29.7 25.1 - 36.5 sec   HEMOGLOBIN A1C    Collection Time: 11/24/22  2:28 PM   Result Value Ref Range    Hemoglobin A1C 6.9 (H) 5.7 - 6.4 %   CBC WITH DIFFERENTIAL    Collection Time: 11/24/22  2:28 PM   Result Value Ref Range    WBC 10.7 4.8 - 10.8 K/UL    RBC 3.93 (L) 4.20 - 6.10 M/UL    Hemoglobin 10.8 (L) 12.0 - 16.0 g/dL    Hct 34.1 (L) 37.0 - 47.0 %    MCV 86.8 81.0 - 99.0 fL    MCH 27.5 27.0 - 31.0 pg    MCHC 31.7 (L) 32.0 - 35.0 g/dL    RDW 13.7 11.5 - 14.5 %    Platelets 186 150 - 400 K/UL    MPV 11.1 9.0 - 12.6 fL    nRBC, Auto 0 0 - 0 %    Neutrophils Relative 76.40 (H) 42.20 - 75.20 %    Lymphocytes % 16.60 (L) 20.50 - 51.10 %    Monocytes % 4.60 1.70 - 9.30 %    Eosinophil % 1.60 0.00 - 10.00 %    Basophil % 0.30 0.00 - 0.80 %    Immature Granulocytes % 0.5 <=0.5 %    Neutrophils, Abs 8.18 (H) 1.40 - 6.50 K/UL    Lymphocytes Absolute 1.8 1.2 - 3.4 K/UL    Monocytes, Man Abs 0.49 0.10 - 0.60 K/UL    Eosinophil % 0.17 0.00 - 0.70 K/UL    Basophils, Man Abs 0.03 0.00 - 0.20 K/UL    Immature Granulocytes, Absolute 0.05 (H) 0.00 - 0.00 K/UL    nRBC, Absolute 0.00 0.00 - 0.00 K/UL   MAGNESIUM    Collection Time: 11/24/22  2:28 PM   Result Value  Ref Range    Magnesium Level 2.0 1.6 - 2.6 mg/dL   POCT GLUCOSE    Collection Time: 11/24/22  4:35 PM   Result Value Ref Range    POC Glucose 76 74 - 106 mg/dL   HEMOGLOBIN    Collection Time: 11/24/22  9:57 PM   Result Value Ref Range    Hemoglobin 9.5 (L) 12.0 - 16.0 g/dL   POCT GLUCOSE    Collection Time: 11/24/22 10:11 PM   Result Value Ref Range    POC Glucose 76 74 - 106 mg/dL   HEMOGLOBIN    Collection Time: 11/25/22  1:07 AM   Result Value Ref Range    Hemoglobin 9.0 (L) 12.0 - 16.0 g/dL   MAGNESIUM    Collection Time: 11/25/22  3:42 AM   Result Value Ref Range    Magnesium Level 1.9 1.6 - 2.6 mg/dL   CBC WITH DIFFERENTIAL    Collection Time: 11/25/22  3:42 AM   Result Value Ref Range    WBC 7.7 4.8 - 10.8 K/UL    RBC 3.34 (L) 4.20 - 6.10 M/UL    Hemoglobin 9.1 (L) 12.0 - 16.0 g/dL    Hct 29.5 (L) 37.0 - 47.0 %    MCV 88.3 81.0 - 99.0 fL    MCH 27.2 27.0 - 31.0 pg    MCHC 30.8 (L) 32.0 - 35.0 g/dL    RDW 13.7 11.5 - 14.5 %    Platelets 155 150 - 400 K/UL    MPV 12.0 9.0 - 12.6 fL    nRBC, Auto 0 0 - 0 %    Neutrophils Relative 70.50 42.20 - 75.20 %    Lymphocytes % 19.60 (L) 20.50 - 51.10 %    Monocytes % 4.70 1.70 - 9.30 %    Eosinophil % 4.30 0.00 - 10.00 %    Basophil % 0.40 0.00 - 0.80 %    Immature Granulocytes % 0.5 <=0.5 %    Neutrophils, Abs 5.46 1.40 - 6.50 K/UL    Lymphocytes Absolute 1.5 1.2 - 3.4 K/UL    Monocytes, Man Abs 0.36 0.10 - 0.60 K/UL    Eosinophil % 0.33 0.00 - 0.70 K/UL    Basophils, Man Abs 0.03 0.00 - 0.20 K/UL    Immature Granulocytes, Absolute 0.04 (H) 0.00 - 0.00 K/UL    nRBC, Absolute 0.00 0.00 - 0.00 K/UL   POCT GLUCOSE    Collection Time: 11/25/22  7:30 AM   Result Value Ref Range    POC Glucose 82 74 - 106 mg/dL   OVA AND PARASITE SCREEN    Collection Time: 11/25/22  8:32 AM    Specimen: Stool    Specimen type and source: Stool, Rectal contents (substance)   Result Value Ref Range    Ova and Parasite Exam See Note    CAMPYLOBACTER JEJUNI AB, GRABIEL    Collection Time: 11/25/22   8:32 AM   Result Value Ref Range    Campylobacter sp Negative Negative   HEMOGLOBIN    Collection Time: 11/25/22  8:37 AM   Result Value Ref Range    Hemoglobin 10.0 (L) 12.0 - 16.0 g/dL   POCT GLUCOSE    Collection Time: 11/25/22 11:51 AM   Result Value Ref Range    POC Glucose 71 (L) 74 - 106 mg/dL   FERRITIN    Collection Time: 11/25/22 12:55 PM   Result Value Ref Range    Ferritin 344.9 (H) 8.0 - 252.0 ng/mL   IRON AND TOTAL IRON BINDING CAPACITY, SERUM    Collection Time: 11/25/22 12:55 PM   Result Value Ref Range    Iron 56 50 - 170 ug/dL    TIBC 197 (L) 250 - 450 ug/dL    Iron Saturation 28 20 - 55 %   POCT GLUCOSE    Collection Time: 11/25/22  4:25 PM   Result Value Ref Range    POC Glucose 66 (L) 74 - 106 mg/dL   POCT GLUCOSE    Collection Time: 11/25/22  7:44 PM   Result Value Ref Range    POC Glucose 59 (L) 74 - 106 mg/dL   POCT GLUCOSE    Collection Time: 11/25/22 10:12 PM   Result Value Ref Range    POC Glucose 177 (H) 74 - 106 mg/dL   CBC WITH DIFFERENTIAL    Collection Time: 11/26/22  4:11 AM   Result Value Ref Range    WBC 6.4 4.8 - 10.8 K/UL    RBC 3.46 (L) 4.20 - 6.10 M/UL    Hemoglobin 9.5 (L) 12.0 - 16.0 g/dL    Hct 29.6 (L) 37.0 - 47.0 %    MCV 85.5 81.0 - 99.0 fL    MCH 27.5 27.0 - 31.0 pg    MCHC 32.1 32.0 - 35.0 g/dL    RDW 13.1 11.5 - 14.5 %    Platelets 166 150 - 400 K/UL    MPV 11.3 9.0 - 12.6 fL    nRBC, Auto 0 0 - 0 %    Neutrophils Relative 67.90 42.20 - 75.20 %    Lymphocytes % 20.30 (L) 20.50 - 51.10 %    Monocytes % 5.50 1.70 - 9.30 %    Eosinophil % 5.50 0.00 - 10.00 %    Basophil % 0.50 0.00 - 0.80 %    Immature Granulocytes % 0.3 <=0.5 %    Neutrophils, Abs 4.33 1.40 - 6.50 K/UL    Lymphocytes Absolute 1.3 1.2 - 3.4 K/UL    Monocytes, Man Abs 0.35 0.10 - 0.60 K/UL    Eosinophil % 0.35 0.00 - 0.70 K/UL    Basophils, Man Abs 0.03 0.00 - 0.20 K/UL    Immature Granulocytes, Absolute 0.02 (H) 0.00 - 0.00 K/UL    nRBC, Absolute 0.00 0.00 - 0.00 K/UL   POCT GLUCOSE    Collection Time:  11/26/22  7:51 AM   Result Value Ref Range    POC Glucose 74 74 - 106 mg/dL   POCT GLUCOSE    Collection Time: 11/26/22  9:45 AM   Result Value Ref Range    POC Glucose 73 (L) 74 - 106 mg/dL   POCT GLUCOSE    Collection Time: 11/26/22 12:18 PM   Result Value Ref Range    POC Glucose 105 74 - 106 mg/dL         Pending Diagnostic Studies:     None         Medications:  Reconciled Home Medications:      Medication List      You have not been prescribed any medications.         Indwelling Lines/Drains at time of discharge:   Lines/Drains/Airways     None               Vitals reviewed  General: NAD, Well developed, Well Nourished  Head: NC/AT  Eyes: EOMI, JUANCARLOS  Cardiovascular: Pulses intact distally, Regular Rate  Pulmonary: Normal Respiratory Rate, No respiratory distress  Gi: Soft, tender to palpation diffusely  Extremities: Warm, No edema present  Skin: Warm, dry  Neuro: Alert, Oriented x3, No focal Deficit  Psych: Appropriate mood and affect      Time spent on the discharge of patient: 45 minutes         Royer Quevedo MD  Department of Hospital Medicine  [facility]

## 2023-01-11 ENCOUNTER — HOSPITAL ENCOUNTER (EMERGENCY)
Facility: HOSPITAL | Age: 53
Discharge: HOME | End: 2023-01-11
Attending: EMERGENCY MEDICINE
Payer: COMMERCIAL

## 2023-01-11 VITALS
TEMPERATURE: 98 F | RESPIRATION RATE: 12 BRPM | HEART RATE: 76 BPM | SYSTOLIC BLOOD PRESSURE: 121 MMHG | WEIGHT: 160 LBS | HEIGHT: 65 IN | BODY MASS INDEX: 26.66 KG/M2 | DIASTOLIC BLOOD PRESSURE: 71 MMHG | OXYGEN SATURATION: 99 %

## 2023-01-11 DIAGNOSIS — R11.2 NAUSEA AND VOMITING, UNSPECIFIED VOMITING TYPE: Primary | ICD-10-CM

## 2023-01-11 DIAGNOSIS — E87.6 HYPOKALEMIA: ICD-10-CM

## 2023-01-11 DIAGNOSIS — E86.0 DEHYDRATION: ICD-10-CM

## 2023-01-11 LAB
ALBUMIN SERPL BCP-MCNC: 4.9 G/DL (ref 3.5–5.2)
ALP SERPL-CCNC: 70 U/L (ref 55–135)
ALT SERPL W/O P-5'-P-CCNC: 7 U/L (ref 10–44)
AMORPH CRY URNS QL MICRO: ABNORMAL
ANION GAP SERPL CALC-SCNC: 16 MMOL/L (ref 8–16)
AST SERPL-CCNC: 22 U/L (ref 10–40)
BACTERIA #/AREA URNS HPF: ABNORMAL /HPF
BASOPHILS # BLD AUTO: 0.07 K/UL (ref 0–0.2)
BASOPHILS NFR BLD: 0.6 % (ref 0–1.9)
BILIRUB SERPL-MCNC: 0.7 MG/DL (ref 0.1–1)
BILIRUB UR QL STRIP: ABNORMAL
BUN SERPL-MCNC: 21 MG/DL (ref 6–20)
CALCIUM SERPL-MCNC: 11 MG/DL (ref 8.7–10.5)
CHLORIDE SERPL-SCNC: 101 MMOL/L (ref 95–110)
CLARITY UR: ABNORMAL
CO2 SERPL-SCNC: 24 MMOL/L (ref 23–29)
COLOR UR: YELLOW
CREAT SERPL-MCNC: 2.2 MG/DL (ref 0.5–1.4)
DIFFERENTIAL METHOD: ABNORMAL
EOSINOPHIL # BLD AUTO: 0 K/UL (ref 0–0.5)
EOSINOPHIL NFR BLD: 0.3 % (ref 0–8)
ERYTHROCYTE [DISTWIDTH] IN BLOOD BY AUTOMATED COUNT: 12.1 % (ref 11.5–14.5)
EST. GFR  (NO RACE VARIABLE): 26.2 ML/MIN/1.73 M^2
GLUCOSE SERPL-MCNC: 131 MG/DL (ref 70–110)
GLUCOSE UR QL STRIP: NEGATIVE
HCT VFR BLD AUTO: 43 % (ref 37–48.5)
HGB BLD-MCNC: 14.8 G/DL (ref 12–16)
HGB UR QL STRIP: ABNORMAL
HYALINE CASTS #/AREA URNS LPF: 3 /LPF
IMM GRANULOCYTES # BLD AUTO: 0.04 K/UL (ref 0–0.04)
IMM GRANULOCYTES NFR BLD AUTO: 0.3 % (ref 0–0.5)
KETONES UR QL STRIP: ABNORMAL
LEUKOCYTE ESTERASE UR QL STRIP: NEGATIVE
LIPASE SERPL-CCNC: 166 U/L (ref 4–60)
LYMPHOCYTES # BLD AUTO: 2.9 K/UL (ref 1–4.8)
LYMPHOCYTES NFR BLD: 24.9 % (ref 18–48)
MCH RBC QN AUTO: 27.4 PG (ref 27–31)
MCHC RBC AUTO-ENTMCNC: 34.4 G/DL (ref 32–36)
MCV RBC AUTO: 80 FL (ref 82–98)
MICROSCOPIC COMMENT: ABNORMAL
MONOCYTES # BLD AUTO: 0.7 K/UL (ref 0.3–1)
MONOCYTES NFR BLD: 6.3 % (ref 4–15)
NEUTROPHILS # BLD AUTO: 7.8 K/UL (ref 1.8–7.7)
NEUTROPHILS NFR BLD: 67.6 % (ref 38–73)
NITRITE UR QL STRIP: NEGATIVE
NRBC BLD-RTO: 0 /100 WBC
PH UR STRIP: 8 [PH] (ref 5–8)
PLATELET # BLD AUTO: 294 K/UL (ref 150–450)
PMV BLD AUTO: 11.8 FL (ref 9.2–12.9)
POTASSIUM SERPL-SCNC: 3.2 MMOL/L (ref 3.5–5.1)
PROT SERPL-MCNC: 8.4 G/DL (ref 6–8.4)
PROT UR QL STRIP: ABNORMAL
RBC # BLD AUTO: 5.4 M/UL (ref 4–5.4)
RBC #/AREA URNS HPF: 28 /HPF (ref 0–4)
SODIUM SERPL-SCNC: 141 MMOL/L (ref 136–145)
SP GR UR STRIP: 1.02 (ref 1–1.03)
SQUAMOUS #/AREA URNS HPF: 4 /HPF
URN SPEC COLLECT METH UR: ABNORMAL
UROBILINOGEN UR STRIP-ACNC: NEGATIVE EU/DL
WBC # BLD AUTO: 11.5 K/UL (ref 3.9–12.7)
WBC #/AREA URNS HPF: 10 /HPF (ref 0–5)

## 2023-01-11 PROCEDURE — 96372 THER/PROPH/DIAG INJ SC/IM: CPT | Performed by: EMERGENCY MEDICINE

## 2023-01-11 PROCEDURE — 83690 ASSAY OF LIPASE: CPT | Performed by: EMERGENCY MEDICINE

## 2023-01-11 PROCEDURE — 25000003 PHARM REV CODE 250: Performed by: EMERGENCY MEDICINE

## 2023-01-11 PROCEDURE — 63600175 PHARM REV CODE 636 W HCPCS: Performed by: EMERGENCY MEDICINE

## 2023-01-11 PROCEDURE — 96365 THER/PROPH/DIAG IV INF INIT: CPT

## 2023-01-11 PROCEDURE — 74176 CT ABD & PELVIS W/O CONTRAST: CPT | Mod: TC

## 2023-01-11 PROCEDURE — 85025 COMPLETE CBC W/AUTO DIFF WBC: CPT | Performed by: EMERGENCY MEDICINE

## 2023-01-11 PROCEDURE — 99285 EMERGENCY DEPT VISIT HI MDM: CPT | Mod: 25

## 2023-01-11 PROCEDURE — 63600175 PHARM REV CODE 636 W HCPCS

## 2023-01-11 PROCEDURE — 74176 CT ABDOMEN PELVIS WITHOUT CONTRAST: ICD-10-PCS | Mod: 26,,, | Performed by: RADIOLOGY

## 2023-01-11 PROCEDURE — 96375 TX/PRO/DX INJ NEW DRUG ADDON: CPT

## 2023-01-11 PROCEDURE — 81000 URINALYSIS NONAUTO W/SCOPE: CPT | Performed by: EMERGENCY MEDICINE

## 2023-01-11 PROCEDURE — 74176 CT ABD & PELVIS W/O CONTRAST: CPT | Mod: 26,,, | Performed by: RADIOLOGY

## 2023-01-11 PROCEDURE — 80053 COMPREHEN METABOLIC PANEL: CPT | Performed by: EMERGENCY MEDICINE

## 2023-01-11 PROCEDURE — 96361 HYDRATE IV INFUSION ADD-ON: CPT

## 2023-01-11 RX ORDER — AMLODIPINE BESYLATE 10 MG/1
10 TABLET ORAL
COMMUNITY
Start: 2022-09-01

## 2023-01-11 RX ORDER — ONDANSETRON 2 MG/ML
INJECTION INTRAMUSCULAR; INTRAVENOUS
Status: COMPLETED
Start: 2023-01-11 | End: 2023-01-11

## 2023-01-11 RX ORDER — ONDANSETRON 2 MG/ML
8 INJECTION INTRAMUSCULAR; INTRAVENOUS
Status: COMPLETED | OUTPATIENT
Start: 2023-01-11 | End: 2023-01-11

## 2023-01-11 RX ORDER — POTASSIUM CHLORIDE 7.45 MG/ML
10 INJECTION INTRAVENOUS
Status: COMPLETED | OUTPATIENT
Start: 2023-01-11 | End: 2023-01-11

## 2023-01-11 RX ORDER — PROMETHAZINE HYDROCHLORIDE 25 MG/1
25 SUPPOSITORY RECTAL EVERY 6 HOURS PRN
Qty: 10 SUPPOSITORY | Refills: 0 | Status: SHIPPED | OUTPATIENT
Start: 2023-01-11

## 2023-01-11 RX ORDER — PROMETHAZINE HYDROCHLORIDE 25 MG/ML
25 INJECTION, SOLUTION INTRAMUSCULAR; INTRAVENOUS
Status: COMPLETED | OUTPATIENT
Start: 2023-01-11 | End: 2023-01-11

## 2023-01-11 RX ORDER — HYDRALAZINE HYDROCHLORIDE 50 MG/1
50 TABLET, FILM COATED ORAL
COMMUNITY
Start: 2022-09-29

## 2023-01-11 RX ORDER — CLONIDINE HYDROCHLORIDE 0.1 MG/1
0.1 TABLET ORAL
COMMUNITY
Start: 2022-08-01

## 2023-01-11 RX ORDER — FAMOTIDINE 10 MG/ML
20 INJECTION INTRAVENOUS
Status: COMPLETED | OUTPATIENT
Start: 2023-01-11 | End: 2023-01-11

## 2023-01-11 RX ORDER — HYDROCODONE BITARTRATE AND ACETAMINOPHEN 10; 325 MG/1; MG/1
TABLET ORAL
COMMUNITY
Start: 2022-10-27

## 2023-01-11 RX ADMIN — ONDANSETRON 8 MG: 2 INJECTION INTRAMUSCULAR; INTRAVENOUS at 08:01

## 2023-01-11 RX ADMIN — POTASSIUM CHLORIDE 10 MEQ: 7.46 INJECTION, SOLUTION INTRAVENOUS at 09:01

## 2023-01-11 RX ADMIN — PROMETHAZINE HYDROCHLORIDE 25 MG: 25 INJECTION INTRAMUSCULAR; INTRAVENOUS at 08:01

## 2023-01-11 RX ADMIN — SODIUM CHLORIDE 1000 ML: 9 INJECTION, SOLUTION INTRAVENOUS at 08:01

## 2023-01-11 RX ADMIN — SODIUM CHLORIDE 1000 ML: 9 INJECTION, SOLUTION INTRAVENOUS at 09:01

## 2023-01-11 RX ADMIN — FAMOTIDINE 20 MG: 10 INJECTION, SOLUTION INTRAVENOUS at 08:01

## 2023-01-12 NOTE — ED PROVIDER NOTES
Encounter Date: 1/11/2023       History     Chief Complaint   Patient presents with    Emesis     Vomiting x 1 week     Pt with hx of HTN, DM and CKD here with c/o NV the past 5days. No diarrhea or constipation. Unable to keep any po down. No abd pain. She thinks one of her meds is causing her symptoms. No hematemesis or coffee ground emesis.     The history is provided by the patient and a relative.   Emesis   This is a new problem. The current episode started several days ago. The problem occurs 5 - 10 times per day. The problem has been unchanged. The emesis has an appearance of stomach contents. Pertinent negatives include no abdominal pain, no arthralgias, no chills, no cough, no diarrhea, no fever, no headaches, no myalgias, no sweats and no URI.   Review of patient's allergies indicates:   Allergen Reactions    Sulfa (sulfonamide antibiotics) Rash     Past Medical History:   Diagnosis Date    Diabetes     Essential (primary) hypertension      History reviewed. No pertinent surgical history.  History reviewed. No pertinent family history.  Social History     Tobacco Use    Smoking status: Never    Smokeless tobacco: Never     Review of Systems   Constitutional:  Negative for chills and fever.   Respiratory:  Negative for cough.    Gastrointestinal:  Positive for nausea and vomiting. Negative for abdominal pain and diarrhea.   Musculoskeletal:  Negative for arthralgias and myalgias.   Neurological:  Negative for headaches.   All other systems reviewed and are negative.    Physical Exam     Initial Vitals [01/11/23 1919]   BP Pulse Resp Temp SpO2   (!) 140/95 103 20 98.4 °F (36.9 °C) 98 %      MAP       --         Physical Exam    Nursing note and vitals reviewed.  Constitutional: She appears well-developed and well-nourished. She is not diaphoretic.   Mildly ill appearing. Nontoxic   HENT:   Mouth/Throat: Oropharynx is clear and moist.   Eyes: Conjunctivae and EOM are normal. No scleral icterus.   Neck: Neck  supple.   Normal range of motion.  Cardiovascular:  Regular rhythm, normal heart sounds and intact distal pulses.           tachy   Pulmonary/Chest: Breath sounds normal.   Abdominal: Abdomen is soft. Bowel sounds are normal. She exhibits no distension and no mass. There is no abdominal tenderness.   Musculoskeletal:         General: No tenderness or edema. Normal range of motion.      Cervical back: Normal range of motion and neck supple.     Neurological: She is alert and oriented to person, place, and time.   Skin: Skin is warm and dry. Capillary refill takes less than 2 seconds. No rash noted. No erythema. No pallor.       ED Course   Procedures  Labs Reviewed   CBC W/ AUTO DIFFERENTIAL - Abnormal; Notable for the following components:       Result Value    MCV 80 (*)     Gran # (ANC) 7.8 (*)     All other components within normal limits   COMPREHENSIVE METABOLIC PANEL - Abnormal; Notable for the following components:    Potassium 3.2 (*)     Glucose 131 (*)     BUN 21 (*)     Creatinine 2.2 (*)     Calcium 11.0 (*)     ALT 7 (*)     eGFR 26.2 (*)     All other components within normal limits   LIPASE - Abnormal; Notable for the following components:    Lipase 166 (*)     All other components within normal limits   URINALYSIS, REFLEX TO URINE CULTURE - Abnormal; Notable for the following components:    Appearance, UA Hazy (*)     Protein, UA 3+ (*)     Ketones, UA 2+ (*)     Bilirubin (UA) 1+ (*)     Occult Blood UA 2+ (*)     All other components within normal limits    Narrative:     Preferred Collection Type->Urine, Clean Catch  Specimen Source->Urine   URINALYSIS MICROSCOPIC - Abnormal; Notable for the following components:    RBC, UA 28 (*)     WBC, UA 10 (*)     Bacteria Moderate (*)     Hyaline Casts, UA 3 (*)     All other components within normal limits    Narrative:     Preferred Collection Type->Urine, Clean Catch  Specimen Source->Urine          Imaging Results              CT Abdomen Pelvis  Without  Contrast (In process)                      Medications   sodium chloride 0.9% bolus 1,000 mL 1,000 mL (0 mLs Intravenous Stopped 1/11/23 2137)   ondansetron injection 8 mg (8 mg Intravenous Given 1/11/23 2008)   famotidine (PF) injection 20 mg (20 mg Intravenous Given 1/11/23 2024)   promethazine injection 25 mg (25 mg Intramuscular Given 1/11/23 2024)   sodium chloride 0.9% bolus 1,000 mL 1,000 mL (0 mLs Intravenous Stopped 1/11/23 2257)   potassium chloride 10 mEq in 100 mL IVPB (0 mEq Intravenous Stopped 1/11/23 2257)     Medical Decision Making:   Differential Diagnosis:   Viral GI illness, dehydration, pancreatitis, UTI, electrolyte derangement  Clinical Tests:   Lab Tests: Ordered and Reviewed  Radiological Study: Ordered and Reviewed  ED Management:  Pt presented with NV and malaise. Dry appearing. Benign abd. CBC and UA unremarkable. CMP with elevated BUN/Cr and K 3.2, She was given Kdur 40meq and 2L NS. She was also given zofran and phenergan. She felt better and was able to tolerate PO fluids. She appeared better. CT neg for acute process. Home with phenergan Rx. Return precautions discussed.           ED Course as of 01/11/23 2300   Wed Jan 11, 2023 2047 Pt has CKD.  [DC]   2235 Pt tolerated po fluids. CT to r/o pancreatitis pending. [DC]      ED Course User Index  [DC] Paxton Coronado Jr., MD                 Clinical Impression:   Final diagnoses:  [R11.2] Nausea and vomiting, unspecified vomiting type (Primary)  [E86.0] Dehydration  [E87.6] Hypokalemia        ED Disposition Condition    Discharge Stable          ED Prescriptions       Medication Sig Dispense Start Date End Date Auth. Provider    promethazine (PHENERGAN) 25 MG suppository Place 1 suppository (25 mg total) rectally every 6 (six) hours as needed for Nausea. 10 suppository 1/11/2023 -- Paxton Coronado Jr., MD          Follow-up Information       Follow up With Specialties Details Why Contact Info    Cameron Lloyd MD Hospitalist,  Family Medicine, Internal Medicine, Cardiology Schedule an appointment as soon as possible for a visit   [address]  [facility] [address]  [phone]               Paxton Coronado Jr., MD  01/11/23 3639

## 2023-01-13 LAB — POCT GLUCOSE: 122 MG/DL (ref 70–110)

## 2023-02-27 PROBLEM — N17.9 AKI (ACUTE KIDNEY INJURY): Status: RESOLVED | Noted: 2022-11-23 | Resolved: 2023-02-27

## 2024-01-29 ENCOUNTER — HOSPITAL ENCOUNTER (EMERGENCY)
Facility: HOSPITAL | Age: 54
Discharge: HOME | End: 2024-01-29
Attending: EMERGENCY MEDICINE
Payer: COMMERCIAL

## 2024-01-29 VITALS
BODY MASS INDEX: 29.82 KG/M2 | SYSTOLIC BLOOD PRESSURE: 141 MMHG | HEIGHT: 65 IN | TEMPERATURE: 98 F | DIASTOLIC BLOOD PRESSURE: 80 MMHG | OXYGEN SATURATION: 94 % | WEIGHT: 179 LBS | RESPIRATION RATE: 20 BRPM | HEART RATE: 71 BPM

## 2024-01-29 DIAGNOSIS — R42 VERTIGO: Primary | ICD-10-CM

## 2024-01-29 DIAGNOSIS — R42 DIZZINESS: ICD-10-CM

## 2024-01-29 LAB
ALBUMIN SERPL BCP-MCNC: 4 G/DL (ref 3.5–5.2)
ALP SERPL-CCNC: 114 U/L (ref 55–135)
ALT SERPL W/O P-5'-P-CCNC: 24 U/L (ref 10–44)
ANION GAP SERPL CALC-SCNC: 14 MMOL/L (ref 8–16)
AST SERPL-CCNC: 45 U/L (ref 10–40)
BACTERIA #/AREA URNS HPF: NORMAL /HPF
BASOPHILS # BLD AUTO: 0.03 K/UL (ref 0–0.2)
BASOPHILS NFR BLD: 0.3 % (ref 0–1.9)
BILIRUB SERPL-MCNC: 0.2 MG/DL (ref 0.1–1)
BILIRUB UR QL STRIP: NEGATIVE
BUN SERPL-MCNC: 16 MG/DL (ref 6–20)
CALCIUM SERPL-MCNC: 9.4 MG/DL (ref 8.7–10.5)
CHLORIDE SERPL-SCNC: 101 MMOL/L (ref 95–110)
CLARITY UR: CLEAR
CO2 SERPL-SCNC: 22 MMOL/L (ref 23–29)
COLOR UR: YELLOW
CREAT SERPL-MCNC: 1.5 MG/DL (ref 0.5–1.4)
DIFFERENTIAL METHOD BLD: ABNORMAL
EOSINOPHIL # BLD AUTO: 0.1 K/UL (ref 0–0.5)
EOSINOPHIL NFR BLD: 0.4 % (ref 0–8)
ERYTHROCYTE [DISTWIDTH] IN BLOOD BY AUTOMATED COUNT: 13.2 % (ref 11.5–14.5)
EST. GFR  (NO RACE VARIABLE): 41.2 ML/MIN/1.73 M^2
GLUCOSE SERPL-MCNC: 270 MG/DL (ref 70–110)
GLUCOSE UR QL STRIP: ABNORMAL
HCT VFR BLD AUTO: 36.9 % (ref 37–48.5)
HGB BLD-MCNC: 11.9 G/DL (ref 12–16)
HGB UR QL STRIP: NEGATIVE
IMM GRANULOCYTES # BLD AUTO: 0.08 K/UL (ref 0–0.04)
IMM GRANULOCYTES NFR BLD AUTO: 0.7 % (ref 0–0.5)
INFLUENZA A, MOLECULAR: NEGATIVE
INFLUENZA B, MOLECULAR: NEGATIVE
KETONES UR QL STRIP: ABNORMAL
LEUKOCYTE ESTERASE UR QL STRIP: ABNORMAL
LYMPHOCYTES # BLD AUTO: 1.3 K/UL (ref 1–4.8)
LYMPHOCYTES NFR BLD: 11.1 % (ref 18–48)
MCH RBC QN AUTO: 25.7 PG (ref 27–31)
MCHC RBC AUTO-ENTMCNC: 32.2 G/DL (ref 32–36)
MCV RBC AUTO: 80 FL (ref 82–98)
MICROSCOPIC COMMENT: NORMAL
MONOCYTES # BLD AUTO: 0.5 K/UL (ref 0.3–1)
MONOCYTES NFR BLD: 4.5 % (ref 4–15)
NEUTROPHILS # BLD AUTO: 9.7 K/UL (ref 1.8–7.7)
NEUTROPHILS NFR BLD: 83 % (ref 38–73)
NITRITE UR QL STRIP: NEGATIVE
NRBC BLD-RTO: 0 /100 WBC
PH UR STRIP: 7 [PH] (ref 5–8)
PLATELET # BLD AUTO: 233 K/UL (ref 150–450)
PMV BLD AUTO: 11 FL (ref 9.2–12.9)
POTASSIUM SERPL-SCNC: 4.1 MMOL/L (ref 3.5–5.1)
PROT SERPL-MCNC: 7.4 G/DL (ref 6–8.4)
PROT UR QL STRIP: NEGATIVE
RBC # BLD AUTO: 4.63 M/UL (ref 4–5.4)
RBC #/AREA URNS HPF: 1 /HPF (ref 0–4)
SARS-COV-2 RDRP RESP QL NAA+PROBE: NEGATIVE
SODIUM SERPL-SCNC: 137 MMOL/L (ref 136–145)
SP GR UR STRIP: 1.01 (ref 1–1.03)
SPECIMEN SOURCE: NORMAL
SQUAMOUS #/AREA URNS HPF: 3 /HPF
URN SPEC COLLECT METH UR: ABNORMAL
UROBILINOGEN UR STRIP-ACNC: NEGATIVE EU/DL
WBC # BLD AUTO: 11.62 K/UL (ref 3.9–12.7)
WBC #/AREA URNS HPF: 3 /HPF (ref 0–5)

## 2024-01-29 PROCEDURE — 96374 THER/PROPH/DIAG INJ IV PUSH: CPT

## 2024-01-29 PROCEDURE — 87502 INFLUENZA DNA AMP PROBE: CPT | Performed by: EMERGENCY MEDICINE

## 2024-01-29 PROCEDURE — 80053 COMPREHEN METABOLIC PANEL: CPT | Performed by: EMERGENCY MEDICINE

## 2024-01-29 PROCEDURE — 96361 HYDRATE IV INFUSION ADD-ON: CPT

## 2024-01-29 PROCEDURE — 93010 ELECTROCARDIOGRAM REPORT: CPT | Mod: ,,, | Performed by: INTERNAL MEDICINE

## 2024-01-29 PROCEDURE — 36415 COLL VENOUS BLD VENIPUNCTURE: CPT | Performed by: EMERGENCY MEDICINE

## 2024-01-29 PROCEDURE — 85025 COMPLETE CBC W/AUTO DIFF WBC: CPT | Performed by: EMERGENCY MEDICINE

## 2024-01-29 PROCEDURE — 99285 EMERGENCY DEPT VISIT HI MDM: CPT | Mod: 25

## 2024-01-29 PROCEDURE — 25000003 PHARM REV CODE 250: Performed by: EMERGENCY MEDICINE

## 2024-01-29 PROCEDURE — 63600175 PHARM REV CODE 636 W HCPCS: Performed by: EMERGENCY MEDICINE

## 2024-01-29 PROCEDURE — 93005 ELECTROCARDIOGRAM TRACING: CPT

## 2024-01-29 PROCEDURE — 70450 CT HEAD/BRAIN W/O DYE: CPT | Mod: TC

## 2024-01-29 PROCEDURE — 70450 CT HEAD/BRAIN W/O DYE: CPT | Mod: 26,,, | Performed by: RADIOLOGY

## 2024-01-29 PROCEDURE — 81000 URINALYSIS NONAUTO W/SCOPE: CPT | Performed by: EMERGENCY MEDICINE

## 2024-01-29 PROCEDURE — U0002 COVID-19 LAB TEST NON-CDC: HCPCS | Performed by: EMERGENCY MEDICINE

## 2024-01-29 RX ORDER — MECLIZINE HCL 12.5 MG 12.5 MG/1
50 TABLET ORAL
Status: COMPLETED | OUTPATIENT
Start: 2024-01-29 | End: 2024-01-29

## 2024-01-29 RX ORDER — ONDANSETRON HYDROCHLORIDE 2 MG/ML
4 INJECTION, SOLUTION INTRAVENOUS
Status: COMPLETED | OUTPATIENT
Start: 2024-01-29 | End: 2024-01-29

## 2024-01-29 RX ORDER — ONDANSETRON 4 MG/1
4 TABLET, ORALLY DISINTEGRATING ORAL EVERY 6 HOURS PRN
Qty: 12 TABLET | Refills: 0 | Status: SHIPPED | OUTPATIENT
Start: 2024-01-29

## 2024-01-29 RX ORDER — MECLIZINE HYDROCHLORIDE 25 MG/1
25 TABLET ORAL 3 TIMES DAILY PRN
Qty: 20 TABLET | Refills: 0 | Status: SHIPPED | OUTPATIENT
Start: 2024-01-29

## 2024-01-29 RX ADMIN — MECLIZINE HYDROCHLORIDE 50 MG: 12.5 TABLET ORAL at 03:01

## 2024-01-29 RX ADMIN — SODIUM CHLORIDE 1000 ML: 9 INJECTION, SOLUTION INTRAVENOUS at 03:01

## 2024-01-29 RX ADMIN — ONDANSETRON 4 MG: 2 INJECTION INTRAMUSCULAR; INTRAVENOUS at 03:01

## 2024-01-30 NOTE — ED PROVIDER NOTES
"Encounter Date: 1/29/2024       History     Chief Complaint   Patient presents with    Dizziness     Began yesterday. "Like the room is spinning"     53-year-old female, here from home via private vehicle for evaluation and treatment of dizziness.  Symptoms began yesterday.  Patient states it feels as if the room is spinning.  She also states that at times she feels as if she may pass out.  Denies any previous episodes of vertigo or syncope.  Denies any symptoms of illness.  No fever or chills.  Nothing she does makes her symptoms any better.  Rapid head movements make her symptoms much worse.  Any medications for symptoms.      Review of patient's allergies indicates:   Allergen Reactions    Sulfa (sulfonamide antibiotics) Rash     Past Medical History:   Diagnosis Date    Diabetes     Essential (primary) hypertension      No past surgical history on file.  No family history on file.  Social History     Tobacco Use    Smoking status: Never    Smokeless tobacco: Never     Review of Systems   Constitutional: Negative.    HENT: Negative.     Eyes: Negative.    Respiratory: Negative.     Cardiovascular: Negative.    Gastrointestinal:  Positive for nausea. Negative for vomiting.   Endocrine: Negative.    Musculoskeletal: Negative.    Skin: Negative.    Neurological:  Positive for dizziness and light-headedness.   Hematological: Negative.    Psychiatric/Behavioral: Negative.         Physical Exam     Initial Vitals [01/29/24 1220]   BP Pulse Resp Temp SpO2   139/82 60 16 98.2 °F (36.8 °C) 99 %      MAP       --         Physical Exam    Nursing note and vitals reviewed.  Constitutional: She appears well-developed and well-nourished. She is not diaphoretic. No distress.   HENT:   Head: Normocephalic and atraumatic.   Right Ear: External ear normal.   Left Ear: External ear normal.   Nose: Nose normal.   Mouth/Throat: Oropharynx is clear and moist. No oropharyngeal exudate.   Eyes: Conjunctivae and EOM are normal. Pupils " are equal, round, and reactive to light. No scleral icterus.   Neck: Neck supple. No JVD present.   Normal range of motion.  Cardiovascular:  Normal rate, regular rhythm, normal heart sounds and intact distal pulses.           No murmur heard.  Pulmonary/Chest: Breath sounds normal. No stridor. No respiratory distress. She has no wheezes. She has no rhonchi. She has no rales.   Abdominal: Abdomen is soft. Bowel sounds are normal. She exhibits no distension. There is no abdominal tenderness. There is no rebound and no guarding.   Musculoskeletal:         General: No tenderness or edema. Normal range of motion.      Cervical back: Normal range of motion and neck supple.     Neurological: She is alert and oriented to person, place, and time. She has normal strength. No cranial nerve deficit or sensory deficit. GCS score is 15. GCS eye subscore is 4. GCS verbal subscore is 5. GCS motor subscore is 6.   No nystagmus, normal strength, normal sensory function, no facial droop, normal speech, positive for horizontal nystagmus   Skin: Skin is warm and dry. Capillary refill takes less than 2 seconds. No rash noted. No erythema.   Psychiatric: She has a normal mood and affect. Her behavior is normal.         ED Course   Procedures  Labs Reviewed   CBC W/ AUTO DIFFERENTIAL - Abnormal; Notable for the following components:       Result Value    Hemoglobin 11.9 (*)     Hematocrit 36.9 (*)     MCV 80 (*)     MCH 25.7 (*)     Immature Granulocytes 0.7 (*)     Gran # (ANC) 9.7 (*)     Immature Grans (Abs) 0.08 (*)     Gran % 83.0 (*)     Lymph % 11.1 (*)     All other components within normal limits   COMPREHENSIVE METABOLIC PANEL - Abnormal; Notable for the following components:    CO2 22 (*)     Glucose 270 (*)     Creatinine 1.5 (*)     AST 45 (*)     eGFR 41.2 (*)     All other components within normal limits   URINALYSIS, REFLEX TO URINE CULTURE - Abnormal; Notable for the following components:    Glucose, UA 1+ (*)      Ketones, UA Trace (*)     Leukocytes, UA Trace (*)     All other components within normal limits    Narrative:     Preferred Collection Type->Urine, Clean Catch  Specimen Source->Urine   INFLUENZA A & B BY MOLECULAR   SARS-COV-2 RNA AMPLIFICATION, QUAL    Narrative:     Is the patient symptomatic?->Yes   URINALYSIS MICROSCOPIC    Narrative:     Preferred Collection Type->Urine, Clean Catch  Specimen Source->Urine     EKG Readings: (Independently Interpreted)   EKG personally reviewed by me shows sinus bradycardia, possible left ventricular hypertrophy, no ST elevation or depression, no T-wave change, 58 beats per minute, [place] interval 184, .  No obvious arrhythmia.         Imaging Results              CT Head Without Contrast (Final result)  Result time 01/29/24 19:53:33      Final result by Roman Piper MD (01/29/24 19:53:33)                   Impression:      No CT evidence of acute intracranial abnormality. Clinical correlation and further evaluation as warranted.      Electronically signed by: Roman Piper MD  Date:    01/29/2024  Time:    19:53               Narrative:    EXAMINATION:  CT HEAD WITHOUT CONTRAST    CLINICAL HISTORY:  Dizziness, persistent/recurrent, cardiac or vascular cause suspected;    TECHNIQUE:  Low dose axial images were obtained through the head.  Coronal and sagittal reformations were also performed. Contrast was not administered.    COMPARISON:  None.    FINDINGS:  There is no acute intracranial hemorrhage, hydrocephalus, midline shift or mass effect. Gray-white matter differentiation appears maintained. The basal cisterns are patent. There is mild mucosal thickening of the left maxillary sinus.  The remaining paranasal sinuses and mastoid air cells appear well aerated.  No acute displaced calvarial fracture identified.                                       Medications   sodium chloride 0.9% bolus 1,000 mL 1,000 mL (0 mLs Intravenous Stopped 1/29/24 1625)   meclizine tablet  50 mg (50 mg Oral Given 1/29/24 1512)   ondansetron injection 4 mg (4 mg Intravenous Given 1/29/24 1513)     Medical Decision Making  Patient was signs and symptoms of vertigo, including nystagmus, nausea etc..  She feels somewhat better after meclizine.  Her symptoms returned after she was moved around for CT of the brain, but she states she is beginning to feel better again.  My suspicion for CVA is low, but at shift change, will have Dr. Maurer review CT results before anticipated discharge.  Patient will follow-up with ENT and primary care.  Meclizine and Zofran prescribed.  Return here as needed or if worse in any way.    Amount and/or Complexity of Data Reviewed  Labs: ordered.  Radiology: ordered.    Risk  Prescription drug management.                                      Clinical Impression:  Final diagnoses:  [R42] Dizziness  [R42] Vertigo (Primary)          ED Disposition Condition    Discharge Stable          ED Prescriptions       Medication Sig Dispense Start Date End Date Auth. Provider    meclizine (ANTIVERT) 25 mg tablet Take 1 tablet (25 mg total) by mouth 3 (three) times daily as needed for Dizziness. 20 tablet 1/29/2024 -- Marco Peralta MD    ondansetron (ZOFRAN-ODT) 4 MG TbDL Take 1 tablet (4 mg total) by mouth every 6 (six) hours as needed (Nausea). 12 tablet 1/29/2024 -- Marco Peralta MD          Follow-up Information       Follow up With Specialties Details Why Contact Info    Cameron Lloyd MD Hospitalist, Family Medicine, Internal Medicine, Cardiology Call in 1 day  [address]  [facility] [place] [address]  [phone]      Fidencio Roberson MD Otolaryngology Call in 1 day  [address] [facility] [place] [address]  [phone]      [facility] Emergency Medicine  As needed, If symptoms worsen [address] [facility] [address] [phone]             Marco Peralta MD  01/30/24 0626       Marco Peralta MD  01/30/24 0627

## 2024-01-30 NOTE — DISCHARGE INSTRUCTIONS
As we discussed, take Zofran for nausea, and take meclizine for vertigo.  Follow-up with your primary care doctor and also follow-up with Dr. Roberson.  Return here as needed or if worse in any way.